# Patient Record
Sex: FEMALE | Race: WHITE | Employment: OTHER | URBAN - METROPOLITAN AREA
[De-identification: names, ages, dates, MRNs, and addresses within clinical notes are randomized per-mention and may not be internally consistent; named-entity substitution may affect disease eponyms.]

---

## 2017-01-17 ENCOUNTER — OFFICE VISIT (OUTPATIENT)
Dept: INTERNAL MEDICINE CLINIC | Age: 62
End: 2017-01-17

## 2017-01-17 VITALS
HEART RATE: 78 BPM | TEMPERATURE: 98.6 F | SYSTOLIC BLOOD PRESSURE: 136 MMHG | DIASTOLIC BLOOD PRESSURE: 64 MMHG | BODY MASS INDEX: 33.79 KG/M2 | OXYGEN SATURATION: 99 % | HEIGHT: 61 IN | WEIGHT: 179 LBS | RESPIRATION RATE: 14 BRPM

## 2017-01-17 DIAGNOSIS — K21.9 GASTROESOPHAGEAL REFLUX DISEASE WITHOUT ESOPHAGITIS: ICD-10-CM

## 2017-01-17 DIAGNOSIS — Z79.890 POSTMENOPAUSAL HRT (HORMONE REPLACEMENT THERAPY): ICD-10-CM

## 2017-01-17 DIAGNOSIS — F41.9 ANXIETY: Primary | ICD-10-CM

## 2017-01-17 DIAGNOSIS — R73.09 ELEVATED GLUCOSE: ICD-10-CM

## 2017-01-17 DIAGNOSIS — M85.80 OSTEOPENIA: ICD-10-CM

## 2017-01-17 DIAGNOSIS — M72.2 PLANTAR FASCIITIS OF LEFT FOOT: ICD-10-CM

## 2017-01-17 LAB — HBA1C MFR BLD HPLC: 5.5 %

## 2017-01-17 NOTE — PATIENT INSTRUCTIONS
I recommend 1200 mg calcium daily, preferably from calcium-rich or calcium-supplemented foods, and 1000 international units of vitamin D daily. You may need to add an additional calcium and vitamin D supplement to reach these goals. The main dietary sources of calcium include milk and other dairy products, such as cottage cheese, yogurt, or hard cheese, and green vegetables, such as kale and broccoli. A rough method of estimating dietary calcium intake is to multiply the number of dairy servings consumed each day by 300 mg. One serving is 8 oz of milk (236 mL) or yogurt (224 g), 1 oz (28 g) of hard cheese, or 16 oz (448 g) of cottage cheese. Plantar Fasciitis: Care Instructions  Your Care Instructions    Plantar fasciitis is pain and inflammation of the plantar fascia, the tissue at the bottom of your foot that connects the heel bone to the toes. The plantar fascia also supports the arch. If you strain the plantar fascia, it can develop small tears and cause heel pain when you stand or walk. Plantar fasciitis can be caused by running or other sports. It also may occur in people who are overweight or who have high arches or flat feet. You may get plantar fasciitis if you walk or stand for long periods, or have a tight Achilles tendon or calf muscles. You can improve your foot pain with rest and other care at home. It might take a few weeks to a few months for your foot to heal completely. Follow-up care is a key part of your treatment and safety. Be sure to make and go to all appointments, and call your doctor if you are having problems. It's also a good idea to know your test results and keep a list of the medicines you take. How can you care for yourself at home? · Rest your feet often. Reduce your activity to a level that lets you avoid pain. If possible, do not run or walk on hard surfaces. · Take pain medicines exactly as directed.   ¨ If the doctor gave you a prescription medicine for pain, take it as prescribed. ¨ If you are not taking a prescription pain medicine, take an over-the-counter anti-inflammatory medicine for pain and swelling, such as ibuprofen (Advil, Motrin) or naproxen (Aleve). Read and follow all instructions on the label. · Use ice massage to help with pain and swelling. You can use an ice cube or an ice cup several times a day. To make an ice cup, fill a paper cup with water and freeze it. Cut off the top of the cup until a half-inch of ice shows. Hold onto the remaining paper to use the cup. Rub the ice in small circles over the area for 5 to 7 minutes. · Contrast baths, which alternate hot and cold water, can also help reduce swelling. But because heat alone may make pain and swelling worse, end a contrast bath with a soak in cold water. · Wear a night splint if your doctor suggests it. A night splint holds your foot with the toes pointed up and the foot and ankle at a 90-degree angle. This position gives the bottom of your foot a constant, gentle stretch. · Do simple exercises such as calf stretches and towel stretches 2 to 3 times each day, especially when you first get up in the morning. These can help the plantar fascia become more flexible. They also make the muscles that support your arch stronger. Hold these stretches for 15 to 30 seconds per stretch. Repeat 2 to 4 times. ¨ Stand about 1 foot from a wall. Place the palms of both hands against the wall at chest level. Lean forward against the wall, keeping one leg with the knee straight and heel on the ground while bending the knee of the other leg. ¨ Sit down on the floor or a mat with your feet stretched in front of you. Roll up a towel lengthwise, and loop it over the ball of your foot. Holding the towel at both ends, gently pull the towel toward you to stretch your foot. · Wear shoes with good arch support. Athletic shoes or shoes with a well-cushioned sole are good choices.   · Try heel cups or shoe inserts (orthotics) to help cushion your heel. You can buy these at many shoe stores. · Put on your shoes as soon as you get out of bed. Going barefoot or wearing slippers may make your pain worse. · Reach and stay at a good weight for your height. This puts less strain on your feet. When should you call for help? Call your doctor now or seek immediate medical care if:  · You have heel pain with fever, redness, or warmth in your heel. · You cannot put weight on the sore foot. Watch closely for changes in your health, and be sure to contact your doctor if:  · You have numbness or tingling in your heel. · Your heel pain lasts more than 2 weeks. Where can you learn more? Go to http://juanjose-severo.info/. Steven Billingsley in the search box to learn more about \"Plantar Fasciitis: Care Instructions. \"  Current as of: May 23, 2016  Content Version: 11.1  © 1134-5195 [employer] [address]. Care instructions adapted under license by [employer] (which disclaims liability or warranty for this information). If you have questions about a medical condition or this instruction, always ask your healthcare professional. [place] [address] any warranty or liability for your use of this information. Plantar Fasciitis: Exercises  Your Care Instructions  Here are some examples of typical rehabilitation exercises for your condition. Start each exercise slowly. Ease off the exercise if you start to have pain. Your doctor or physical therapist will tell you when you can start these exercises and which ones will work best for you. How to do the exercises  Note: Each exercise should create a pulling feeling but should not cause pain. Towel stretch    1. Sit with your legs extended and knees straight. 2. Place a towel around your foot just under the toes. 3. Hold each end of the towel in each hand, with your hands above your knees.   4. Pull back with the towel so that your foot stretches toward you.  5. Hold the position for at least 15 to 30 seconds. 6. Repeat 2 to 4 times a session, up to 5 sessions a day. Calf stretch    Note: This exercise stretches the muscles at the back of the lower leg (the calf) and the Achilles tendon. Do this exercise 3 or 4 times a day, 5 days a week. 1. Stand facing a wall with your hands on the wall at about eye level. Put the leg you want to stretch about a step behind your other leg. 2. Keeping your back heel on the floor, bend your front knee until you feel a stretch in the back leg. 3. Hold the stretch for 15 to 30 seconds. Repeat 2 to 4 times. Plantar fascia and calf stretch    Note: Stretching the plantar fascia and calf muscles can increase flexibility and decrease heel pain. You can do this exercise several times each day and before and after activity. 1. Stand on a step as shown above. Be sure to hold on to the banister. 2. Slowly let your heels down over the edge of the step as you relax your calf muscles. You should feel a gentle stretch across the bottom of your foot and up the back of your leg to your knee. 3. Hold the stretch about 15 to 30 seconds, and then tighten your calf muscle a little to bring your heel back up to the level of the step. Repeat 2 to 4 times. Towel curls    1. While sitting, place your foot on a towel on the floor and scrunch the towel toward you with your toes. 2. Then, also using your toes, push the towel away from you. Note: Make this exercise more challenging by placing a weighted object, such as a soup can, on the other end of the towel. Samson pickups    1. Put marbles on the floor next to a cup.  2. Using your toes, try to lift the marbles up from the floor and put them in the cup. Follow-up care is a key part of your treatment and safety. Be sure to make and go to all appointments, and call your doctor if you are having problems.  It's also a good idea to know your test results and keep a list of the medicines you take.  Where can you learn more? Go to http://juanjose-severo.info/. Reena Lucas in the search box to learn more about \"Plantar Fasciitis: Exercises. \"  Current as of: May 23, 2016  Content Version: 11.1  © 1057-2695 [employer]. Care instructions adapted under license by [employer] (which disclaims liability or warranty for this information). If you have questions about a medical condition or this instruction, always ask your healthcare professional. [address] any warranty or liability for your use of this information.

## 2017-01-17 NOTE — PROGRESS NOTES
HPI:  [facility] is a 62y.o. year old female who returns to clinic today for follow up:   1. Anxiety: Current treatment includes Lexapro and no other therapies. Ongoing symptoms include: none. She denies: racing thoughts, feelings of losing control, difficulty concentrating, SI/SA, depression. Reported side effects from the treatment: none. 2. GERD: Taking medication daily and not having any breakthrough symptoms. Following gerd precautions. 3. Osteopenia: Taking calcium and vit D in diet and supplements, last bone density reviewed. No falls. Exercise: walking  4. HRT: hot flashes controlled on current dose of every other day premarin. She is trying to taper off of premarin. has not been using vagifem for vaginal dryness due to cost.    5. Left heel pain that is not improving and affecting ability to exercise. No improved with heel support and new shoes. Current Outpatient Prescriptions   Medication Sig Dispense Refill    escitalopram oxalate (LEXAPRO) 5 mg tablet Take 1 Tab by mouth daily. 90 Tab 1    esomeprazole (NEXIUM) 40 mg capsule TAKE 1 CAPSULE DAILY  Indications: GASTROESOPHAGEAL REFLUX 90 Cap 3    VIT C/VIT E/LUTEIN/MIN/OMEGA-3 (OCUVITE PO) Take  by mouth two (2) times a day.  LUTEIN PO Take 40 mg by mouth daily.  conjugated estrogens (PREMARIN) 0.3 mg tablet TAKE 1 TABLET DAILY 90 Tab 3    fluticasone (FLONASE) 50 mcg/actuation nasal spray 2 Sprays by Both Nostrils route daily. 3 Bottle 1    CALCIUM CITRATE/VITAMIN D3 (CITRACAL + D PO) Take  by mouth.  latanoprost (XALATAN) 0.005 % ophthalmic solution Administer 1 Drop to right eye nightly.  dorzolamide-timolol (COSOPT) 2-0.5 % ophthalmic solution Administer 1 Drop to right eye two (2) times a day.  multivitamins-minerals-lutein (CENTRUM SILVER) Tab Take  by mouth.  GLUCOSAM & CHONDROIT-MV & MIN3 (GLUCOSAMINE &CHONDROIT-MV-MIN3 PO) Take  by mouth.  ascorbic acid (VITAMIN C) 1,000 mg tablet Take  by mouth. No Known Allergies  Social History   Substance Use Topics    Smoking status: Never Smoker    Smokeless tobacco: Never Used    Alcohol use 0.0 oz/week      Comment: 3 drinks per year         Review of Systems   Constitutional: Negative for malaise/fatigue. Respiratory: Negative for shortness of breath. Cardiovascular: Negative for chest pain and leg swelling. Gastrointestinal: Negative for abdominal pain and heartburn. Neurological: Negative for dizziness and headaches. Physical Exam   Constitutional: She appears well-developed. No distress. /74 (BP 1 Location: Left arm, BP Patient Position: Sitting)  Pulse 78  Temp 98.6 °F (37 °C) (Oral)   Resp 14  Ht 5' 1\" (1.549 m)  Wt 179 lb (81.2 kg)  LMP 08/01/2004  SpO2 99%  BMI 33.82 kg/m2   Neck: Carotid bruit is not present. Cardiovascular: Normal rate, regular rhythm, normal heart sounds and intact distal pulses. No murmur heard. Pulmonary/Chest: Effort normal and breath sounds normal. She has no wheezes. Abdominal: Soft. Bowel sounds are normal. She exhibits no distension and no mass. There is no tenderness. Musculoskeletal: She exhibits no edema. Left heel plantar tenderness. Psychiatric: She has a normal mood and affect. Vitals reviewed. Assessment & Plan:    ICD-10-CM ICD-9-CM    1. Anxiety  Well controlled, continue current medication. F41.9 300.00    2. Gastroesophageal reflux disease without esophagitis  Well controlled, continue current medication. K21.9 530.81    3. Osteopenia  Calcium vit D and exercise. M85.80 733.90    4. Postmenopausal HRT (hormone replacement therapy)  Continue to try tapering premarin Z79.890 V07.4    5. Elevated glucose  Follow low sugar diet. a1c 5.5.  R73.09 790.29 AMB POC HEMOGLOBIN A1C   6. Plantar fasciitis of left foot  Counseled and will refer to podiatry M72.2 728.71 REFERRAL TO PODIATRY        Follow-up Disposition:  Return in about 6 months (around 7/17/2017) for follow up. Advised her to call back or return to office if symptoms worsen/change/persist.  Discussed expected course/resolution/complications of diagnosis in detail with patient. Medication risks/benefits/costs/interactions/alternatives discussed with patient. She was given an after visit summary which includes diagnoses, current medications, & vitals. She expressed understanding with the diagnosis and plan.

## 2017-08-11 ENCOUNTER — OFFICE VISIT (OUTPATIENT)
Dept: INTERNAL MEDICINE CLINIC | Age: 62
End: 2017-08-11

## 2017-08-11 VITALS
SYSTOLIC BLOOD PRESSURE: 120 MMHG | TEMPERATURE: 97.9 F | DIASTOLIC BLOOD PRESSURE: 70 MMHG | HEIGHT: 62 IN | WEIGHT: 184.6 LBS | HEART RATE: 94 BPM | OXYGEN SATURATION: 98 % | RESPIRATION RATE: 12 BRPM | BODY MASS INDEX: 33.97 KG/M2

## 2017-08-11 DIAGNOSIS — N89.8 VAGINAL DRYNESS: ICD-10-CM

## 2017-08-11 DIAGNOSIS — R73.09 ELEVATED GLUCOSE: ICD-10-CM

## 2017-08-11 DIAGNOSIS — K21.9 GASTROESOPHAGEAL REFLUX DISEASE WITHOUT ESOPHAGITIS: ICD-10-CM

## 2017-08-11 DIAGNOSIS — M85.80 OSTEOPENIA, UNSPECIFIED LOCATION: ICD-10-CM

## 2017-08-11 DIAGNOSIS — Z00.00 ROUTINE GENERAL MEDICAL EXAMINATION AT A HEALTH CARE FACILITY: Primary | ICD-10-CM

## 2017-08-11 RX ORDER — ESTRADIOL 10 UG/1
INSERT VAGINAL
Qty: 24 TAB | Refills: 3 | Status: SHIPPED | OUTPATIENT
Start: 2017-08-11 | End: 2019-09-12

## 2017-08-11 RX ORDER — ESOMEPRAZOLE MAGNESIUM 40 MG/1
CAPSULE, DELAYED RELEASE ORAL
Qty: 90 CAP | Refills: 3 | Status: SHIPPED | OUTPATIENT
Start: 2017-08-11 | End: 2018-07-07 | Stop reason: SDUPTHER

## 2017-08-11 NOTE — MR AVS SNAPSHOT
Visit Information Date & Time Provider Department Dept. Phone Encounter #  
 8/11/2017 10:45 AM Dora Hutton, [address] [facility] [phone] [id number] Follow-up Instructions Return in about 1 year (around 8/11/2018) for physical.  
  
Upcoming Health Maintenance Date Due COLONOSCOPY 3/29/2017 INFLUENZA AGE 9 TO ADULT 8/1/2017 DTaP/Tdap/Td series (2 - Td) 9/16/2018 BREAST CANCER SCRN MAMMOGRAM 11/11/2018 Allergies as of 8/11/2017  Review Complete On: 8/11/2017 By: Dora Hutton MD  
 No Known Allergies Current Immunizations  Reviewed on 11/4/2016 Name Date H1N1 FLU VACCINE 1/10/2010 Influenza Vaccine 10/5/2016, 10/15/2015, 10/5/2014, 10/5/2013 Influenza Vaccine Split 10/17/2012, 10/14/2011 TDAP Vaccine 9/16/2008 Zoster Vaccine, Live 11/1/2016 Not reviewed this visit You Were Diagnosed With   
  
 Codes Comments Routine general medical examination at a health care facility    -  Primary ICD-10-CM: Z00.00 ICD-9-CM: V70.0 Gastroesophageal reflux disease without esophagitis     ICD-10-CM: K21.9 ICD-9-CM: 530.81 Osteopenia, unspecified location     ICD-10-CM: M85.80 ICD-9-CM: 733.90 Vaginal dryness     ICD-10-CM: N89.8 ICD-9-CM: 625.8 Elevated glucose     ICD-10-CM: R73.09 
ICD-9-CM: 790.29 Vitals BP Pulse Temp Resp Height(growth percentile) Weight(growth percentile) 120/70 (BP 1 Location: Right arm, BP Patient Position: Sitting) 94 97.9 °F (36.6 °C) (Oral) 12 5' 1.5\" (1.562 m) 184 lb 9.6 oz (83.7 kg) LMP SpO2 BMI OB Status Smoking Status 08/01/2004 98% 34.32 kg/m2 Hysterectomy Never Smoker BMI and BSA Data Body Mass Index Body Surface Area  
 34.32 kg/m 2 1.91 m 2 Preferred Pharmacy Pharmacy Name Phone [address] [facility] [phone] Your Updated Medication List  
  
   
 This list is accurate as of: 8/11/17 11:43 AM.  Always use your most recent med list.  
  
  
  
  
 CENTRUM SILVER Tab tablet Generic drug:  multivitamins-minerals-lutein Take  by mouth. CITRACAL + D PO Take  by mouth. COSOPT 22.3-6.8 mg/mL ophthalmic solution Generic drug:  dorzolamide-timolol Administer 1 Drop to right eye two (2) times a day. esomeprazole 40 mg capsule Commonly known as:  NEXIUM  
TAKE 1 CAPSULE DAILY  Indications: gastroesophageal reflux disease  
  
 estradiol 10 mcg Tab vaginal tablet Commonly known as:  Rommel Aung Vaginal tablet, 10 mcg inserted VAGINALLY once daily for 2 weeks; then, 10 mcg VAGINALLY twice weekly  
  
 fluticasone 50 mcg/actuation nasal spray Commonly known as:  FLONASE  
USE 2 SPRAYS IN EACH NOSTRIL DAILY  
  
 GLUCOSAMINE &CHONDROIT-MV-MIN3 PO Take  by mouth. LUTEIN PO Take 40 mg by mouth daily. OCUVITE PO Take  by mouth two (2) times a day. VITAMIN C 1,000 mg tablet Generic drug:  ascorbic acid (vitamin C) Take  by mouth. XALATAN 0.005 % ophthalmic solution Generic drug:  latanoprost  
Administer 1 Drop to right eye nightly. Prescriptions Sent to Pharmacy Refills  
 esomeprazole (NEXIUM) 40 mg capsule 3 Sig: TAKE 1 CAPSULE DAILY  Indications: gastroesophageal reflux disease Class: Normal  
 Pharmacy: [address], [address] Ph #: [phone]  
 estradiol (VAGIFEM) 10 mcg tab vaginal tablet 3 Sig: Vaginal tablet, 10 mcg inserted VAGINALLY once daily for 2 weeks; then, 10 mcg VAGINALLY twice weekly Class: Normal  
 Pharmacy: [address], [address] Ph #: [phone] We Performed the Following CBC WITH AUTOMATED DIFF [01835 CPT(R)] HEMOGLOBIN A1C WITH EAG [92944 CPT(R)] LIPID PANEL [04984 CPT(R)] METABOLIC PANEL, COMPREHENSIVE [45171 CPT(R)] Follow-up Instructions Return in about 1 year (around 8/11/2018) for physical.  
  
  
Patient Instructions [facility] External Physician  
  
  
  Specialty  
  Endocrinology, Internal Medicine  
   
Primary Contact Information   
  Phone Fax E-mail Address  
  [phone] Not available. [address] [facility] [address] [facility]  
   [place] [id number]  
     
   
Additional Contact Information   
  Phone Fax E-mail Address  
  [phone] Not available. [phone]  
     
   
 
 
 
 
  
Introducing [facility] & [facility] SERVICES! Dear Bhavesh Dodd: Thank you for requesting a [employer] account. Our records indicate that you already have an active WeWork account. You can access your account anytime at [email or web address] Did you know that you can access your hospital and ER discharge instructions at any time in WeWork? You can also review all of your test results from your hospital stay or ER visit. Additional Information If you have questions, please visit the Frequently Asked Questions section of the WeWork website at [email or web address] Remember, WeWork is NOT to be used for urgent needs. For medical emergencies, dial 911. Now available from your iPhone and Android! Please provide this summary of care documentation to your next provider. Your primary care clinician is listed as Pili Rose. If you have any questions after today's visit, please call [phone].

## 2017-08-11 NOTE — PATIENT INSTRUCTIONS
[facility]    External Physician           Specialty     Endocrinology, Internal Medicine       Primary Contact Information      Phone Fax E-mail Address     [phone] Not available. [address] [facility]      [address] [place] [id number]             Additional Contact Information      Phone Fax E-mail Address     [phone] Not available.  [phone]

## 2017-08-12 NOTE — PROGRESS NOTES
Subjective:   62 y.o. female for Well Woman Check. She is postmenopausal.  Social History: not sexually active. She complains of vaginal dryness and request vagifem which she has used with improvement in her symptoms in the past.  GERD: She has tried to stop Nexium but is having significant symptoms of indigestion. She has been taking Pepcid and Tums with poor control. Would like to restart Nexium. Osteopenia:  Taking calcium and vit D,  last bone density reviewed. No falls. Exercise: some walking. Current Outpatient Prescriptions   Medication Sig Dispense Refill    esomeprazole (NEXIUM) 40 mg capsule TAKE 1 CAPSULE DAILY  Indications: gastroesophageal reflux disease 90 Cap 3    estradiol (VAGIFEM) 10 mcg tab vaginal tablet Vaginal tablet, 10 mcg inserted VAGINALLY once daily for 2 weeks; then, 10 mcg VAGINALLY twice weekly 24 Tab 3    fluticasone (FLONASE) 50 mcg/actuation nasal spray USE 2 SPRAYS IN EACH NOSTRIL DAILY 48 g 5    VIT C/VIT E/LUTEIN/MIN/OMEGA-3 (OCUVITE PO) Take  by mouth two (2) times a day.  LUTEIN PO Take 40 mg by mouth daily.  CALCIUM CITRATE/VITAMIN D3 (CITRACAL + D PO) Take  by mouth.  latanoprost (XALATAN) 0.005 % ophthalmic solution Administer 1 Drop to right eye nightly.  dorzolamide-timolol (COSOPT) 2-0.5 % ophthalmic solution Administer 1 Drop to right eye two (2) times a day.  multivitamins-minerals-lutein (CENTRUM SILVER) Tab Take  by mouth.  GLUCOSAM & CHONDROIT-MV & MIN3 (GLUCOSAMINE &CHONDROIT-MV-MIN3 PO) Take  by mouth.  ascorbic acid (VITAMIN C) 1,000 mg tablet Take  by mouth.        No Known Allergies  Past Medical History:   Diagnosis Date    Allergic rhinitis     Anxiety 7/19/2016    Diverticulosis     GERD (gastroesophageal reflux disease)     Glaucoma     Osteopenia     S/P colonoscopy 2008    diverticuli     Past Surgical History:   Procedure Laterality Date    BREAST SURGERY PROCEDURE UNLISTED  1995    breast biopsy    BREAST SURGERY PROCEDURE UNLISTED  2001    breast cyst aspiration    ENDOSCOPY, COLON, DIAGNOSTIC  January 2008    HX BREAST BIOPSY Right 1993    neg    HX CYST INCISION AND DRAINAGE Left 2001    neg    HX ORTHOPAEDIC  2002    ganglion cyst    HX REFRACTIVE SURGERY  1999    with Dr. Elie Waggoner , subsequently she had a right amcular crack and has been treated by Dr. Selma Sanders       Family History   Problem Relation Age of Onset    Other Mother      Peptic ulcer disease    Osteoporosis Mother     Hypertension Mother      pulmonary hypertension    Lung Disease Mother 80     pulmonary hypertension    Cancer Maternal Aunt      breast cancer     Social History   Substance Use Topics    Smoking status: Never Smoker    Smokeless tobacco: Never Used    Alcohol use 0.0 oz/week      Comment: 3 drinks per year        ROS:  Feeling well. No dyspnea or chest pain on exertion. No abdominal pain, change in bowel habits, black or bloody stools. No urinary tract symptoms. GYN ROS: no breast pain or new or enlarging lumps on self exam, no vaginal bleeding, no discharge or pelvic pain. Menopausal symptoms: vaginal dryness. No neurological complaints. Objective:     Visit Vitals    /70 (BP 1 Location: Right arm, BP Patient Position: Sitting)    Pulse 94    Temp 97.9 °F (36.6 °C) (Oral)    Resp 12    Ht 5' 1.5\" (1.562 m)    Wt 184 lb 9.6 oz (83.7 kg)    LMP 08/01/2004    SpO2 98%    BMI 34.32 kg/m2     The patient appears well, alert, oriented x 3, in no distress. ENT normal.  Neck supple. No adenopathy or thyromegaly. ELISABETH. Lungs are clear, good air entry, no wheezes, rhonchi or rales. S1 and S2 normal, no murmurs, regular rate and rhythm. Abdomen soft without tenderness, guarding, mass or organomegaly. Extremities show no edema, normal peripheral pulses. Neurological is normal, no focal findings.     BREAST EXAM: breasts appear normal, no suspicious masses, no skin or nipple changes or axillary nodes    PELVIC EXAM: s/p hyst. Exam not indicated    Assessment/Plan:     Diagnoses and all orders for this visit:    1. Routine general medical examination at a health care facility  Counseled continue healthy lifestyle, exercise, diet and weight.      -     CBC WITH AUTOMATED DIFF  -     METABOLIC PANEL, COMPREHENSIVE  -     LIPID PANEL    2. Gastroesophageal reflux disease without esophagitis  nexium every day. GERD precautions    3. Osteopenia, unspecified location  Calcium, vitamin D, exercise. 4. Vaginal dryness  Trial of Vagifem. Also recommend over-the-counter lubricants. 5. Elevated glucose  Mild elevation follow low sugar low carbohydrate diet and lose weight  -     HEMOGLOBIN A1C WITH EAG    Other orders  -     esomeprazole (NEXIUM) 40 mg capsule; TAKE 1 CAPSULE DAILY  Indications: gastroesophageal reflux disease  -     estradiol (VAGIFEM) 10 mcg tab vaginal tablet; Vaginal tablet, 10 mcg inserted VAGINALLY once daily for 2 weeks; then, 10 mcg VAGINALLY twice weekly    . I have discussed the diagnosis with the patient and the intended plan as seen in the above orders. The patient has received an after-visit summary and questions were answered concerning future plans. I have discussed medication side effects and warnings with the patient as well.   She has expressed understanding of the diagnosis and plan    Follow-up Disposition:  Return in about 1 year (around 8/11/2018) for physical.

## 2017-08-15 LAB
ALBUMIN SERPL-MCNC: 4.2 G/DL (ref 3.6–4.8)
ALBUMIN/GLOB SERPL: 1.8 {RATIO} (ref 1.2–2.2)
ALP SERPL-CCNC: 122 IU/L (ref 39–117)
ALT SERPL-CCNC: 14 IU/L (ref 0–32)
AST SERPL-CCNC: 14 IU/L (ref 0–40)
BASOPHILS # BLD AUTO: 0 X10E3/UL (ref 0–0.2)
BASOPHILS NFR BLD AUTO: 1 %
BILIRUB SERPL-MCNC: 0.4 MG/DL (ref 0–1.2)
BUN SERPL-MCNC: 17 MG/DL (ref 8–27)
BUN/CREAT SERPL: 21 (ref 12–28)
CALCIUM SERPL-MCNC: 8.8 MG/DL (ref 8.7–10.3)
CHLORIDE SERPL-SCNC: 105 MMOL/L (ref 96–106)
CHOLEST SERPL-MCNC: 201 MG/DL (ref 100–199)
CO2 SERPL-SCNC: 24 MMOL/L (ref 18–29)
CREAT SERPL-MCNC: 0.8 MG/DL (ref 0.57–1)
EOSINOPHIL # BLD AUTO: 0.3 X10E3/UL (ref 0–0.4)
EOSINOPHIL NFR BLD AUTO: 6 %
ERYTHROCYTE [DISTWIDTH] IN BLOOD BY AUTOMATED COUNT: 13.9 % (ref 12.3–15.4)
EST. AVERAGE GLUCOSE BLD GHB EST-MCNC: 108 MG/DL
GLOBULIN SER CALC-MCNC: 2.3 G/DL (ref 1.5–4.5)
GLUCOSE SERPL-MCNC: 104 MG/DL (ref 65–99)
HBA1C MFR BLD: 5.4 % (ref 4.8–5.6)
HCT VFR BLD AUTO: 41.5 % (ref 34–46.6)
HDLC SERPL-MCNC: 75 MG/DL
HGB BLD-MCNC: 13.9 G/DL (ref 11.1–15.9)
IMM GRANULOCYTES # BLD: 0 X10E3/UL (ref 0–0.1)
IMM GRANULOCYTES NFR BLD: 0 %
LDLC SERPL CALC-MCNC: 104 MG/DL (ref 0–99)
LYMPHOCYTES # BLD AUTO: 1.3 X10E3/UL (ref 0.7–3.1)
LYMPHOCYTES NFR BLD AUTO: 24 %
MCH RBC QN AUTO: 29.1 PG (ref 26.6–33)
MCHC RBC AUTO-ENTMCNC: 33.5 G/DL (ref 31.5–35.7)
MCV RBC AUTO: 87 FL (ref 79–97)
MONOCYTES # BLD AUTO: 0.3 X10E3/UL (ref 0.1–0.9)
MONOCYTES NFR BLD AUTO: 6 %
NEUTROPHILS # BLD AUTO: 3.4 X10E3/UL (ref 1.4–7)
NEUTROPHILS NFR BLD AUTO: 63 %
PLATELET # BLD AUTO: 234 X10E3/UL (ref 150–379)
POTASSIUM SERPL-SCNC: 4.6 MMOL/L (ref 3.5–5.2)
PROT SERPL-MCNC: 6.5 G/DL (ref 6–8.5)
RBC # BLD AUTO: 4.77 X10E6/UL (ref 3.77–5.28)
SODIUM SERPL-SCNC: 144 MMOL/L (ref 134–144)
TRIGL SERPL-MCNC: 110 MG/DL (ref 0–149)
VLDLC SERPL CALC-MCNC: 22 MG/DL (ref 5–40)
WBC # BLD AUTO: 5.4 X10E3/UL (ref 3.4–10.8)

## 2017-11-20 ENCOUNTER — HOSPITAL ENCOUNTER (OUTPATIENT)
Dept: MAMMOGRAPHY | Age: 62
Discharge: HOME | End: 2017-11-20
Attending: INTERNAL MEDICINE
Payer: COMMERCIAL

## 2017-11-20 DIAGNOSIS — Z12.31 VISIT FOR SCREENING MAMMOGRAM: ICD-10-CM

## 2017-11-20 PROCEDURE — 77067 SCR MAMMO BI INCL CAD: CPT

## 2018-08-16 ENCOUNTER — OFFICE VISIT (OUTPATIENT)
Dept: INTERNAL MEDICINE CLINIC | Age: 63
End: 2018-08-16

## 2018-08-16 VITALS
OXYGEN SATURATION: 97 % | DIASTOLIC BLOOD PRESSURE: 80 MMHG | RESPIRATION RATE: 16 BRPM | WEIGHT: 189.6 LBS | BODY MASS INDEX: 34.89 KG/M2 | HEART RATE: 88 BPM | HEIGHT: 62 IN | TEMPERATURE: 97.4 F | SYSTOLIC BLOOD PRESSURE: 120 MMHG

## 2018-08-16 DIAGNOSIS — E66.01 SEVERE OBESITY (BMI 35.0-39.9): ICD-10-CM

## 2018-08-16 DIAGNOSIS — Z23 ENCOUNTER FOR IMMUNIZATION: ICD-10-CM

## 2018-08-16 DIAGNOSIS — M85.80 OSTEOPENIA, UNSPECIFIED LOCATION: ICD-10-CM

## 2018-08-16 DIAGNOSIS — Z12.31 VISIT FOR SCREENING MAMMOGRAM: ICD-10-CM

## 2018-08-16 DIAGNOSIS — Z00.00 ROUTINE GENERAL MEDICAL EXAMINATION AT A HEALTH CARE FACILITY: Primary | ICD-10-CM

## 2018-08-16 DIAGNOSIS — Z78.0 POSTMENOPAUSAL: ICD-10-CM

## 2018-08-16 DIAGNOSIS — M79.89 SWELLING OF LOWER EXTREMITY: ICD-10-CM

## 2018-08-16 DIAGNOSIS — N89.8 VAGINAL DRYNESS: ICD-10-CM

## 2018-08-16 DIAGNOSIS — K21.9 GASTROESOPHAGEAL REFLUX DISEASE WITHOUT ESOPHAGITIS: ICD-10-CM

## 2018-08-16 RX ORDER — ESOMEPRAZOLE MAGNESIUM 40 MG/1
CAPSULE, DELAYED RELEASE ORAL
Qty: 90 CAP | Refills: 3 | Status: SHIPPED | OUTPATIENT
Start: 2018-08-16 | End: 2019-09-12 | Stop reason: SDUPTHER

## 2018-08-16 NOTE — PATIENT INSTRUCTIONS
[facility]    External Physician           Specialty     Endocrinology, Internal Medicine       Primary Contact Information      Phone Fax E-mail Address     [phone] Not available. [address] [facility]      [place] [address]             Additional Contact Information      Phone Fax E-mail Address     [phone] Not available.  [facility] [phone]

## 2018-08-16 NOTE — MR AVS SNAPSHOT
[address] [address] 
[phone] Patient: Salazar Andre MRN: [id number] IHV:[id number] Visit Information Date & Time Provider Department Dept. Phone Encounter #  
 8/16/2018  9:15 AM Nabeel Cohen, [facility] [phone] [id number] Upcoming Health Maintenance Date Due Influenza Age 5 to Adult 8/1/2018 DTaP/Tdap/Td series (2 - Td) 9/16/2018 BREAST CANCER SCRN MAMMOGRAM 11/20/2019 COLONOSCOPY 2/6/2028 Allergies as of 8/16/2018  Review Complete On: 8/16/2018 By: Nabeel Cohen MD  
 No Known Allergies Current Immunizations  Reviewed on 11/4/2016 Name Date H1N1 FLU VACCINE 1/10/2010 Influenza Vaccine 10/5/2016, 10/15/2015, 10/5/2014, 10/5/2013 Influenza Vaccine Split 10/17/2012, 10/14/2011 TDAP Vaccine 9/16/2008 Tdap  Incomplete Zoster Vaccine, Live 11/1/2016 Not reviewed this visit You Were Diagnosed With   
  
 Codes Comments Routine general medical examination at a health care facility    -  Primary ICD-10-CM: Z00.00 ICD-9-CM: V70.0 Gastroesophageal reflux disease without esophagitis     ICD-10-CM: K21.9 ICD-9-CM: 530.81 Osteopenia, unspecified location     ICD-10-CM: M85.80 ICD-9-CM: 733.90 Vaginal dryness     ICD-10-CM: N89.8 ICD-9-CM: 625.8 Severe obesity (BMI 35.0-39.9) ([facility])     ICD-10-CM: E66.01 
ICD-9-CM: 278.01 Visit for screening mammogram     ICD-10-CM: Z12.31 
ICD-9-CM: V76.12 Postmenopausal     ICD-10-CM: Z78.0 ICD-9-CM: V49.81 Encounter for immunization     ICD-10-CM: Y64 ICD-9-CM: V03.89 Swelling of lower extremity     ICD-10-CM: M79.89 ICD-9-CM: 729.81 Vitals BP Pulse Temp Resp Height(growth percentile) Weight(growth percentile) 120/80 (BP 1 Location: Left arm, BP Patient Position: Sitting) 88 97.4 °F (36.3 °C) (Oral) 16 5' 1.5\" (1.562 m) 189 lb 9.6 oz (86 kg) LMP SpO2 BMI OB Status Smoking Status 2004 97% 35.24 kg/m2 Hysterectomy Never Smoker BMI and BSA Data Body Mass Index Body Surface Area  
 35.24 kg/m 2 1.93 m 2 Preferred Pharmacy Pharmacy Name Phone [address] [phone] Your Updated Medication List  
  
   
This list is accurate as of 18 10:10 AM.  Always use your most recent med list.  
  
  
  
  
 CENTRUM SILVER Tab tablet Generic drug:  multivitamins-minerals-lutein Take  by mouth. CITRACAL + D PO Take  by mouth. COSOPT 22.3-6.8 mg/mL ophthalmic solution Generic drug:  dorzolamide-timolol Administer 1 Drop to right eye two (2) times a day. esomeprazole 40 mg capsule Commonly known as:  NEXIUM  
TAKE 1 CAPSULE DAILY FOR GASTROESOPHAGEAL REFLUX DISEASE  
  
 estradiol 10 mcg Tab vaginal tablet Commonly known as:  Alexa Ip Vaginal tablet, 10 mcg inserted VAGINALLY once daily for 2 weeks; then, 10 mcg VAGINALLY twice weekly  
  
 fluticasone 50 mcg/actuation nasal spray Commonly known as:  FLONASE  
USE 2 SPRAYS IN EACH NOSTRIL DAILY  
  
 GLUCOSAMINE &CHONDROIT-MV-MIN3 PO Take  by mouth. LUTEIN PO Take 40 mg by mouth daily. OCUVITE PO Take  by mouth two (2) times a day. varicella-zoster recombinant (PF) 50 mcg/0.5 mL Susr injection Commonly known as:  SHINGRIX  
0.5 ml intramuscular now and then repeat in 2-4 months. VITAMIN C 1,000 mg tablet Generic drug:  ascorbic acid (vitamin C) Take  by mouth. XALATAN 0.005 % ophthalmic solution Generic drug:  latanoprost  
Administer 1 Drop to right eye nightly. Prescriptions Printed Refills  
 varicella-zoster recombinant, PF, (SHINGRIX) 50 mcg/0.5 mL susr injection 1 Si.5 ml intramuscular now and then repeat in 2-4 months. Class: Print Prescriptions Sent to Pharmacy Refills esomeprazole (NEXIUM) 40 mg capsule 3 Sig: TAKE 1 CAPSULE DAILY FOR GASTROESOPHAGEAL REFLUX DISEASE Class: Normal  
 Pharmacy: [facility], [address] [place] [address] #: [phone] We Performed the Following CBC WITH AUTOMATED DIFF [26513 CPT(R)] HEMOGLOBIN A1C WITH EAG [12589 CPT(R)] LIPID PANEL [66135 CPT(R)] METABOLIC PANEL, COMPREHENSIVE [37529 CPT(R)] TETANUS, DIPHTHERIA TOXOIDS AND ACELLULAR PERTUSSIS VACCINE (TDAP), IN INDIVIDS. >=7, IM [id number] CPT(R)] To-Do List   
 08/16/2018 Imaging:  DEXA BONE DENSITY STUDY AXIAL   
  
 08/16/2018 Imaging:  RAMEZ 3D JASON W MAMMO BI SCREENING INCL CAD Referral Information Referral ID Ref[facility] By [facility]  
  
 [id number] Visits Status Start Date End Date 1 New Request 8/16/18 8/16/19 If your referral has a status of pending review or denied, additional information will be sent to support the outcome of this decision. Patient Instructions [facility] External Physician  
  
  
  Specialty  
  Endocrinology, Internal Medicine  
   
Primary Contact Information   
  Phone Fax E-mail Address  
  [phone] Not available. [facility] [address] [facility]  
   [place] [address]  
     
   
Additional Contact Information   
  Phone Fax E-mail Address  
  [phone] Not available. [phone]  
     
   
 
  
 
 
  
Introducing [facility]! Dear [facility]: Thank you for requesting a [email or web address] account. Our records indicate that you already have an active [email or web address] account. You can access your account anytime at [email or web address] Did you know that you can access your hospital and ER discharge instructions at any time in [email or web address]? You can also review all of your test results from your hospital stay or ER visit. Additional Information If you have questions, please visit the Frequently Asked Questions section of the [email or web address] website at [email or web address] Remember, [email or web address] is NOT to be used for urgent needs. For medical emergencies, dial 911. Now available from your iPhone and Android! Please provide this summary of care documentation to your next provider. Your primary care clinician is listed as Viviane Kelly. If you have any questions after today's visit, please call [phone].

## 2018-08-16 NOTE — PROGRESS NOTES
Subjective:   65 y.o. female for Well Woman Check. She is postmenopausal. S/p hyst.   Social History: single partner, contraception - status post hysterectomy and post menopausal status. She notes some swelling left lower leg area when she traveled to [place] and did wear some compression stockings on the plane. Since returning from her trip note some mild swelling in left ankle. She is trying to follow a healthy diet. She is not as active. GERD: Taking medication daily and if misses dose has breakthrough gerd symptoms. Following gerd precautions. Osteopenia:  Taking calcium and vit D,  last bone density reviewed. No falls this year. Exercise: some walking  Current Outpatient Prescriptions   Medication Sig Dispense Refill    esomeprazole (NEXIUM) 40 mg capsule TAKE 1 CAPSULE DAILY FOR GASTROESOPHAGEAL REFLUX DISEASE 90 Cap 0    fluticasone (FLONASE) 50 mcg/actuation nasal spray USE 2 SPRAYS IN EACH NOSTRIL DAILY 48 g 5    estradiol (VAGIFEM) 10 mcg tab vaginal tablet Vaginal tablet, 10 mcg inserted VAGINALLY once daily for 2 weeks; then, 10 mcg VAGINALLY twice weekly 24 Tab 3    VIT C/VIT E/LUTEIN/MIN/OMEGA-3 (OCUVITE PO) Take  by mouth two (2) times a day.  LUTEIN PO Take 40 mg by mouth daily.  CALCIUM CITRATE/VITAMIN D3 (CITRACAL + D PO) Take  by mouth.  latanoprost (XALATAN) 0.005 % ophthalmic solution Administer 1 Drop to right eye nightly.  dorzolamide-timolol (COSOPT) 2-0.5 % ophthalmic solution Administer 1 Drop to right eye two (2) times a day.  multivitamins-minerals-lutein (CENTRUM SILVER) Tab Take  by mouth.  GLUCOSAM & CHONDROIT-MV & MIN3 (GLUCOSAMINE &CHONDROIT-MV-MIN3 PO) Take  by mouth.  ascorbic acid (VITAMIN C) 1,000 mg tablet Take  by mouth.        No Known Allergies  Past Medical History:   Diagnosis Date    Allergic rhinitis     Anxiety 7/19/2016    Diverticulosis     GERD (gastroesophageal reflux disease)     Glaucoma     Osteopenia     S/P colonoscopy 2008    diverticuli     Past Surgical History:   Procedure Laterality Date    BREAST SURGERY PROCEDURE UNLISTED  1995    breast biopsy    BREAST SURGERY PROCEDURE UNLISTED  2001    breast cyst aspiration    ENDOSCOPY, COLON, DIAGNOSTIC  January 2008    HX BREAST BIOPSY Right 1993    neg    HX CYST INCISION AND DRAINAGE Left 2001    neg    HX ORTHOPAEDIC  2002    ganglion cyst    HX REFRACTIVE SURGERY  1999    with Dr. Patricia Márquez , subsequently she had a right amcular crack and has been treated by Dr. Elsa Padilla       Family History   Problem Relation Age of Onset    Other Mother      Peptic ulcer disease    Osteoporosis Mother     Hypertension Mother      pulmonary hypertension    Lung Disease Mother 80     pulmonary hypertension    Cancer Maternal Aunt      breast cancer     Social History   Substance Use Topics    Smoking status: Never Smoker    Smokeless tobacco: Never Used    Alcohol use 0.0 oz/week      Comment: 3 drinks per year        ROS:  Feeling well. No dyspnea or chest pain on exertion. No abdominal pain, change in bowel habits, black or bloody stools. No urinary tract symptoms. GYN ROS: no breast pain or new or enlarging lumps on self exam, no vaginal bleeding, no discharge or pelvic pain. Menopausal symptoms: none. No neurological complaints. Objective:     Visit Vitals    /80 (BP 1 Location: Left arm, BP Patient Position: Sitting)    Pulse 88    Temp 97.4 °F (36.3 °C) (Oral)    Resp 16    Ht 5' 1.5\" (1.562 m)    Wt 189 lb 9.6 oz (86 kg)    LMP 08/01/2004    SpO2 97%    BMI 35.24 kg/m2     The patient appears well, alert, oriented x 3, in no distress. ENT normal.  Neck supple. No adenopathy or thyromegaly. ELISABETH. Lungs are clear, good air entry, no wheezes, rhonchi or rales. S1 and S2 normal, no murmurs, regular rate and rhythm. Abdomen soft without tenderness, guarding, mass or organomegaly.  Extremities show no edema, normal peripheral pulses. Neurological is normal, no focal findings. BREAST EXAM: breasts appear normal, no suspicious masses, no skin or nipple changes or axillary nodes    PELVIC EXAM: not indicated. Assessment/Plan:       ICD-10-CM ICD-9-CM    1. Routine general medical examination at a health care facility  Health maintenance reviewed and updated with patient today at visit. Z00.00 V70.0 CBC WITH AUTOMATED DIFF      METABOLIC PANEL, COMPREHENSIVE      LIPID PANEL      HEMOGLOBIN A1C WITH EAG   2. Gastroesophageal reflux disease without esophagitis  . [email or web address]  K21.9 530.81    3. Osteopenia, unspecified location  Ca vit D and exercise. M85.80 733.90 DEXA BONE DENSITY STUDY AXIAL   4. Vaginal dryness  Continue vagifem doing well. N89.8 625.8    5. Severe obesity (BMI 35.0-39.9) ([facility] [address]  Counseled regarding elevated BMI and current weight goals. Reviewed weight loss strategies including dietary changes and exercise. E66.01 278.01    6. Visit for screening mammogram Z12.31 V76.12 RAMEZ 3D JASON W MAMMO BI SCREENING INCL CAD   7. Postmenopausal Z78.0 V49.81 DEXA BONE DENSITY STUDY AXIAL   8. Encounter for immunization Z23 V03.89 TETANUS, DIPHTHERIA TOXOIDS AND ACELLULAR PERTUSSIS VACCINE (TDAP), IN INDIVIDS. >=7, IM   9. Swelling of lower extremity  Counseled regarding compression stockings, avoiding salt, weight loss, elevation of legs. M79.89 729.81    . Orders Placed This Encounter    RAMEZ 3D JASON W MAMMO BI SCREENING INCL CAD    DEXA BONE DENSITY STUDY AXIAL    TETANUS, DIPHTHERIA TOXOIDS AND ACELLULAR PERTUSSIS VACCINE (TDAP), IN INDIVIDS. >=7, IM    CBC WITH AUTOMATED DIFF    METABOLIC PANEL, COMPREHENSIVE    LIPID PANEL    HEMOGLOBIN A1C WITH EAG    varicella-zoster recombinant, PF, (SHINGRIX) 50 mcg/0.5 mL susr injection    esomeprazole (NEXIUM) 40 mg capsule      I have discussed the diagnosis with the patient and the intended plan as seen in the above orders.   The patient has received an after-visit summary and questions were answered concerning future plans. I have discussed medication side effects and warnings with the patient as well.   She has expressed understanding of the diagnosis and plan    Follow-up Disposition:  Return in about 1 year (around 8/16/2019) for physical.

## 2018-08-16 NOTE — PROGRESS NOTES
Chief Complaint   Patient presents with    Physical     Patient states she is here for her physical.

## 2018-08-17 LAB
ALBUMIN SERPL-MCNC: 4.5 G/DL (ref 3.6–4.8)
ALBUMIN/GLOB SERPL: 1.9 {RATIO} (ref 1.2–2.2)
ALP SERPL-CCNC: 146 IU/L (ref 39–117)
ALT SERPL-CCNC: 16 IU/L (ref 0–32)
AST SERPL-CCNC: 16 IU/L (ref 0–40)
BASOPHILS # BLD AUTO: 0 X10E3/UL (ref 0–0.2)
BASOPHILS NFR BLD AUTO: 1 %
BILIRUB SERPL-MCNC: 0.5 MG/DL (ref 0–1.2)
BUN SERPL-MCNC: 15 MG/DL (ref 8–27)
BUN/CREAT SERPL: 20 (ref 12–28)
CALCIUM SERPL-MCNC: 9.9 MG/DL (ref 8.7–10.3)
CHLORIDE SERPL-SCNC: 103 MMOL/L (ref 96–106)
CHOLEST SERPL-MCNC: 230 MG/DL (ref 100–199)
CO2 SERPL-SCNC: 25 MMOL/L (ref 20–29)
CREAT SERPL-MCNC: 0.74 MG/DL (ref 0.57–1)
EOSINOPHIL # BLD AUTO: 0.2 X10E3/UL (ref 0–0.4)
EOSINOPHIL NFR BLD AUTO: 3 %
ERYTHROCYTE [DISTWIDTH] IN BLOOD BY AUTOMATED COUNT: 14.3 % (ref 12.3–15.4)
EST. AVERAGE GLUCOSE BLD GHB EST-MCNC: 111 MG/DL
GLOBULIN SER CALC-MCNC: 2.4 G/DL (ref 1.5–4.5)
GLUCOSE SERPL-MCNC: 91 MG/DL (ref 65–99)
HBA1C MFR BLD: 5.5 % (ref 4.8–5.6)
HCT VFR BLD AUTO: 42.9 % (ref 34–46.6)
HDLC SERPL-MCNC: 69 MG/DL
HGB BLD-MCNC: 14.3 G/DL (ref 11.1–15.9)
IMM GRANULOCYTES # BLD: 0 X10E3/UL (ref 0–0.1)
IMM GRANULOCYTES NFR BLD: 0 %
LDLC SERPL CALC-MCNC: 137 MG/DL (ref 0–99)
LYMPHOCYTES # BLD AUTO: 1.5 X10E3/UL (ref 0.7–3.1)
LYMPHOCYTES NFR BLD AUTO: 23 %
MCH RBC QN AUTO: 29.3 PG (ref 26.6–33)
MCHC RBC AUTO-ENTMCNC: 33.3 G/DL (ref 31.5–35.7)
MCV RBC AUTO: 88 FL (ref 79–97)
MONOCYTES # BLD AUTO: 0.5 X10E3/UL (ref 0.1–0.9)
MONOCYTES NFR BLD AUTO: 8 %
NEUTROPHILS # BLD AUTO: 4.2 X10E3/UL (ref 1.4–7)
NEUTROPHILS NFR BLD AUTO: 65 %
PLATELET # BLD AUTO: 205 X10E3/UL (ref 150–379)
POTASSIUM SERPL-SCNC: 5 MMOL/L (ref 3.5–5.2)
PROT SERPL-MCNC: 6.9 G/DL (ref 6–8.5)
RBC # BLD AUTO: 4.88 X10E6/UL (ref 3.77–5.28)
SODIUM SERPL-SCNC: 141 MMOL/L (ref 134–144)
TRIGL SERPL-MCNC: 122 MG/DL (ref 0–149)
VLDLC SERPL CALC-MCNC: 24 MG/DL (ref 5–40)
WBC # BLD AUTO: 6.4 X10E3/UL (ref 3.4–10.8)

## 2018-08-31 DIAGNOSIS — R74.8 ELEVATED ALKALINE PHOSPHATASE LEVEL: Primary | ICD-10-CM

## 2018-11-21 ENCOUNTER — HOSPITAL ENCOUNTER (OUTPATIENT)
Dept: MAMMOGRAPHY | Age: 63
Discharge: HOME | End: 2018-11-21
Attending: INTERNAL MEDICINE
Payer: COMMERCIAL

## 2018-11-21 DIAGNOSIS — Z78.0 POSTMENOPAUSAL: ICD-10-CM

## 2018-11-21 DIAGNOSIS — M85.80 OSTEOPENIA, UNSPECIFIED LOCATION: ICD-10-CM

## 2018-11-21 DIAGNOSIS — Z12.31 VISIT FOR SCREENING MAMMOGRAM: ICD-10-CM

## 2018-11-21 PROCEDURE — 77063 BREAST TOMOSYNTHESIS BI: CPT

## 2018-11-21 PROCEDURE — 77080 DXA BONE DENSITY AXIAL: CPT

## 2019-08-20 RX ORDER — FLUTICASONE PROPIONATE 50 MCG
SPRAY, SUSPENSION (ML) NASAL
Qty: 48 G | Refills: 4 | Status: SHIPPED | OUTPATIENT
Start: 2019-08-20 | End: 2020-10-07

## 2019-09-12 ENCOUNTER — OFFICE VISIT (OUTPATIENT)
Dept: INTERNAL MEDICINE CLINIC | Age: 64
End: 2019-09-12

## 2019-09-12 VITALS
DIASTOLIC BLOOD PRESSURE: 64 MMHG | HEART RATE: 82 BPM | RESPIRATION RATE: 16 BRPM | OXYGEN SATURATION: 99 % | WEIGHT: 145 LBS | HEIGHT: 62 IN | SYSTOLIC BLOOD PRESSURE: 98 MMHG | TEMPERATURE: 97.9 F | BODY MASS INDEX: 26.68 KG/M2

## 2019-09-12 DIAGNOSIS — Z12.31 VISIT FOR SCREENING MAMMOGRAM: ICD-10-CM

## 2019-09-12 DIAGNOSIS — K21.9 GASTROESOPHAGEAL REFLUX DISEASE WITHOUT ESOPHAGITIS: ICD-10-CM

## 2019-09-12 DIAGNOSIS — Z00.00 ROUTINE GENERAL MEDICAL EXAMINATION AT A HEALTH CARE FACILITY: Primary | ICD-10-CM

## 2019-09-12 DIAGNOSIS — M85.80 OSTEOPENIA, UNSPECIFIED LOCATION: ICD-10-CM

## 2019-09-12 DIAGNOSIS — N89.8 VAGINAL DRYNESS: ICD-10-CM

## 2019-09-12 PROBLEM — E66.01 SEVERE OBESITY (BMI 35.0-39.9): Status: RESOLVED | Noted: 2018-08-16 | Resolved: 2019-09-12

## 2019-09-12 RX ORDER — METFORMIN HYDROCHLORIDE 500 MG/1
500 TABLET, EXTENDED RELEASE ORAL 3 TIMES DAILY
COMMUNITY
Start: 2019-09-10 | End: 2022-09-13 | Stop reason: SDUPTHER

## 2019-09-12 RX ORDER — ESOMEPRAZOLE MAGNESIUM 40 MG/1
CAPSULE, DELAYED RELEASE ORAL
Qty: 90 CAP | Refills: 3 | Status: SHIPPED | OUTPATIENT
Start: 2019-09-12 | End: 2020-08-12

## 2019-09-12 NOTE — PATIENT INSTRUCTIONS
I recommend 1200 mg calcium daily, preferably from calcium-rich or calcium-supplemented foods, and 1000 international units of vitamin D daily. You may need to add an additional calcium and vitamin D supplement to reach these goals. The main dietary sources of calcium include milk and other dairy products, such as cottage cheese, yogurt, or hard cheese, and green vegetables, such as kale and broccoli. A rough method of estimating dietary calcium intake is to multiply the number of dairy servings consumed each day by 300 mg. One serving is 8 oz of milk (236 mL) or yogurt (224 g), 1 oz (28 g) of hard cheese, or 16 oz (448 g) of cottage cheese.

## 2019-09-12 NOTE — PROGRESS NOTES
Subjective:   [facility] is here today for a full physical.      Health Maintenance  Immunizations:    Influenza: she will plan on getting it this fall. Tetanus: up to date. Gardasil: n/a. Cancer screening:    . S/p hyst  Breast: reviewed guidelines, reviewed SBE with her, UTD  reviewed guidelines, UTD  Colonoscopy and bone density are up-to-date. GERD: Taking medication daily and if misses dose has breakthrough gerd symptoms. Following gerd precautions  Osteopenia:  Taking calcium 1200 mg in diet as able and supplement and vit D 1000 iu,  last bone density reviewed. No falls this year. Exercise: none at this time. Vaginal dryness: intercourse in uncomfortable. Vagifem is helpful but she states forgets vagifem. She has lost 48 pounds and is on wt loss program [employer]. Patient Care Team:  Maury Prasad MD as PCP - General     The following sections were reviewed & updated as appropriate: PMH, PSH, FH, and SH. Patient Active Problem List   Diagnosis Code    Osteopenia M85.80    Allergic rhinitis, cause unspecified J30.9    GERD (gastroesophageal reflux disease) K21.9    Advance directive discussed with patient Z70.80    Anxiety F41.9          Prior to Admission medications    Medication Sig Start Date End Date Taking? Authorizing Provider   metFORMIN ER (GLUCOPHAGE XR) 500 mg tablet  9/10/19  Yes Provider, Historical   fluticasone propionate (FLONASE) 50 mcg/actuation nasal spray USE 2 SPRAYS IN EACH NOSTRIL DAILY 8/20/19  Yes Maury Prasad MD   esomeprazole (NEXIUM) 40 mg capsule TAKE 1 CAPSULE DAILY FOR GASTROESOPHAGEAL REFLUX DISEASE 8/16/18  Yes Maury Prasad MD   VIT C/VIT E/LUTEIN/MIN/OMEGA-3 (OCUVITE PO) Take  by mouth two (2) times a day. Yes Provider, Historical   LUTEIN PO Take 40 mg by mouth daily. Yes Provider, Historical   CALCIUM CITRATE/VITAMIN D3 (CITRACAL + D PO) Take  by mouth.    Yes Provider, Historical   latanoprost (XALATAN) 0.005 % ophthalmic solution Administer 1 Drop to right eye nightly. Yes Provider, Historical   dorzolamide-timolol (COSOPT) 2-0.5 % ophthalmic solution Administer 1 Drop to right eye two (2) times a day. Yes Provider, Historical   multivitamins-minerals-lutein (CENTRUM SILVER) Tab Take  by mouth. Yes Provider, Historical   GLUCOSAM & CHONDROIT-MV & MIN3 (GLUCOSAMINE &CHONDROIT-MV-MIN3 PO) Take  by mouth. Yes Provider, Historical   ascorbic acid (VITAMIN C) 1,000 mg tablet Take  by mouth. Yes Provider, Historical          No Known Allergies           Review of Systems   Constitutional: Negative for chills, fever and malaise/fatigue. HENT: Negative for congestion and sore throat. Eyes: Negative for blurred vision and double vision. Respiratory: Negative for cough, shortness of breath and wheezing. Cardiovascular: Negative for chest pain, palpitations and leg swelling. Gastrointestinal: Negative for abdominal pain, blood in stool, constipation, diarrhea, nausea and vomiting. Genitourinary: Negative for dysuria, frequency and urgency. Musculoskeletal: Negative for back pain, joint pain and myalgias. Skin: Negative for rash. Neurological: Negative for dizziness, sensory change, focal weakness and headaches. Psychiatric/Behavioral: Negative for depression. The patient is not nervous/anxious and does not have insomnia. Objective:   Physical Exam   Constitutional: She appears well-developed. No distress. HENT:   Head: Normocephalic and atraumatic. Right Ear: Tympanic membrane and ear canal normal.   Left Ear: Tympanic membrane and ear canal normal.   Nose: Nose normal.   Mouth/Throat: Oropharynx is clear and moist.   Eyes: Pupils are equal, round, and reactive to light. Conjunctivae and EOM are normal.   Neck: Normal range of motion. No thyromegaly present. Cardiovascular: Normal rate, regular rhythm, normal heart sounds and intact distal pulses. No murmur heard.   Pulmonary/Chest: Effort normal and breath sounds normal.   Breast exam: breasts appear normal, no suspicious masses, no skin or nipple changes or axillary nodes. Abdominal: Soft. Bowel sounds are normal. She exhibits no mass. There is no tenderness. Musculoskeletal: She exhibits no edema. Lymphadenopathy:     She has no cervical adenopathy. Neurological: She has normal strength. No cranial nerve deficit or sensory deficit. Skin: No rash noted. Psychiatric: She has a normal mood and affect. Nursing note and vitals reviewed. Visit Vitals  BP 98/64 (BP 1 Location: Right arm)   Pulse 82   Temp 97.9 °F (36.6 °C) (Oral)   Resp 16   Ht 5' 1.5\" (1.562 m)   Wt 145 lb (65.8 kg)   LMP 08/01/2004   SpO2 99%   BMI 26.95 kg/m²         Glynn Davis is a 59 y.o. female who was seen in clinic today (9/12/2019) for a full physical.       Assessment & Plan:   Encounter Diagnoses   Name Primary?  Routine general medical examination at a health care facility  Health maintenance reviewed and updated with patient today at visit. Yes    Gastroesophageal reflux disease without esophagitis  Well-controlled on Nexium. You trying to alternate her days of Nexium with over-the-counter Pepcid or Zantac to see if she is able to taper down. She does plan to try this.  Osteopenia, unspecified location  Counseled calcium, vitamin D, exercise recommendations     Visit for screening mammogram     Vaginal dryness  Review treatment options particularly over the lubricants such as uber. If this is not effective she will contact me about restarting Vagifem.       Orders Placed This Encounter    RAMEZ 3D JASON W MAMMO BI SCREENING INCL CAD    CBC WITH AUTOMATED DIFF    METABOLIC PANEL, COMPREHENSIVE    LIPID PANEL    TSH RFX ON ABNORMAL TO FREE T4   Follow-up in 1 year for physical.  Advised her to call back or return to office if symptoms worsen/change/persist.  Discussed expected course/resolution/complications of diagnosis in detail with patient. Medication risks/benefits/costs/interactions/alternatives discussed with patient. She was given an after visit summary which includes diagnoses, current medications, & vitals. She expressed understanding with the diagnosis and plan. Aspects of this note may have been generated using voice recognition software. Despite editing, there may be some syntax errors.        Maritza Kessler MD

## 2019-09-14 LAB
ALBUMIN SERPL-MCNC: 4.4 G/DL (ref 3.6–4.8)
ALBUMIN/GLOB SERPL: 2.3 {RATIO} (ref 1.2–2.2)
ALP SERPL-CCNC: 125 IU/L (ref 39–117)
ALT SERPL-CCNC: 14 IU/L (ref 0–32)
AST SERPL-CCNC: 13 IU/L (ref 0–40)
BASOPHILS # BLD AUTO: 0.1 X10E3/UL (ref 0–0.2)
BASOPHILS NFR BLD AUTO: 1 %
BILIRUB SERPL-MCNC: 0.4 MG/DL (ref 0–1.2)
BUN SERPL-MCNC: 17 MG/DL (ref 8–27)
BUN/CREAT SERPL: 25 (ref 12–28)
CALCIUM SERPL-MCNC: 9.6 MG/DL (ref 8.7–10.3)
CHLORIDE SERPL-SCNC: 102 MMOL/L (ref 96–106)
CHOLEST SERPL-MCNC: 179 MG/DL (ref 100–199)
CO2 SERPL-SCNC: 29 MMOL/L (ref 20–29)
CREAT SERPL-MCNC: 0.67 MG/DL (ref 0.57–1)
EOSINOPHIL # BLD AUTO: 0.3 X10E3/UL (ref 0–0.4)
EOSINOPHIL NFR BLD AUTO: 6 %
ERYTHROCYTE [DISTWIDTH] IN BLOOD BY AUTOMATED COUNT: 12.7 % (ref 12.3–15.4)
GLOBULIN SER CALC-MCNC: 1.9 G/DL (ref 1.5–4.5)
GLUCOSE SERPL-MCNC: 102 MG/DL (ref 65–99)
HCT VFR BLD AUTO: 41.8 % (ref 34–46.6)
HDLC SERPL-MCNC: 76 MG/DL
HGB BLD-MCNC: 14.1 G/DL (ref 11.1–15.9)
IMM GRANULOCYTES # BLD AUTO: 0 X10E3/UL (ref 0–0.1)
IMM GRANULOCYTES NFR BLD AUTO: 0 %
LDLC SERPL CALC-MCNC: 90 MG/DL (ref 0–99)
LYMPHOCYTES # BLD AUTO: 1.4 X10E3/UL (ref 0.7–3.1)
LYMPHOCYTES NFR BLD AUTO: 24 %
MCH RBC QN AUTO: 29.1 PG (ref 26.6–33)
MCHC RBC AUTO-ENTMCNC: 33.7 G/DL (ref 31.5–35.7)
MCV RBC AUTO: 86 FL (ref 79–97)
MONOCYTES # BLD AUTO: 0.5 X10E3/UL (ref 0.1–0.9)
MONOCYTES NFR BLD AUTO: 9 %
NEUTROPHILS # BLD AUTO: 3.6 X10E3/UL (ref 1.4–7)
NEUTROPHILS NFR BLD AUTO: 60 %
PLATELET # BLD AUTO: 255 X10E3/UL (ref 150–450)
POTASSIUM SERPL-SCNC: 5.1 MMOL/L (ref 3.5–5.2)
PROT SERPL-MCNC: 6.3 G/DL (ref 6–8.5)
RBC # BLD AUTO: 4.84 X10E6/UL (ref 3.77–5.28)
SODIUM SERPL-SCNC: 142 MMOL/L (ref 134–144)
TRIGL SERPL-MCNC: 63 MG/DL (ref 0–149)
TSH SERPL DL<=0.005 MIU/L-ACNC: 2.81 UIU/ML (ref 0.45–4.5)
VLDLC SERPL CALC-MCNC: 13 MG/DL (ref 5–40)
WBC # BLD AUTO: 5.9 X10E3/UL (ref 3.4–10.8)

## 2019-12-10 ENCOUNTER — HOSPITAL ENCOUNTER (OUTPATIENT)
Dept: MAMMOGRAPHY | Age: 64
Discharge: HOME | End: 2019-12-10
Attending: INTERNAL MEDICINE
Payer: COMMERCIAL

## 2019-12-10 DIAGNOSIS — Z12.31 VISIT FOR SCREENING MAMMOGRAM: ICD-10-CM

## 2019-12-10 PROCEDURE — 77063 BREAST TOMOSYNTHESIS BI: CPT

## 2020-08-12 RX ORDER — ESOMEPRAZOLE MAGNESIUM 40 MG/1
CAPSULE, DELAYED RELEASE ORAL
Qty: 90 CAP | Refills: 1 | Status: SHIPPED | OUTPATIENT
Start: 2020-08-12 | End: 2020-09-15 | Stop reason: SDUPTHER

## 2020-09-15 ENCOUNTER — OFFICE VISIT (OUTPATIENT)
Dept: INTERNAL MEDICINE CLINIC | Age: 65
End: 2020-09-15
Payer: COMMERCIAL

## 2020-09-15 VITALS
HEART RATE: 65 BPM | SYSTOLIC BLOOD PRESSURE: 125 MMHG | BODY MASS INDEX: 25.53 KG/M2 | OXYGEN SATURATION: 100 % | DIASTOLIC BLOOD PRESSURE: 81 MMHG | WEIGHT: 135.2 LBS | HEIGHT: 61 IN | TEMPERATURE: 97.8 F | RESPIRATION RATE: 16 BRPM

## 2020-09-15 DIAGNOSIS — M85.80 OSTEOPENIA, UNSPECIFIED LOCATION: ICD-10-CM

## 2020-09-15 DIAGNOSIS — M17.0 PRIMARY OSTEOARTHRITIS OF BOTH KNEES: ICD-10-CM

## 2020-09-15 DIAGNOSIS — Z78.0 POSTMENOPAUSAL: ICD-10-CM

## 2020-09-15 DIAGNOSIS — M54.50 CHRONIC BILATERAL LOW BACK PAIN WITHOUT SCIATICA: ICD-10-CM

## 2020-09-15 DIAGNOSIS — Z00.00 ROUTINE GENERAL MEDICAL EXAMINATION AT A HEALTH CARE FACILITY: Primary | ICD-10-CM

## 2020-09-15 DIAGNOSIS — G89.29 CHRONIC BILATERAL LOW BACK PAIN WITHOUT SCIATICA: ICD-10-CM

## 2020-09-15 DIAGNOSIS — Z12.31 VISIT FOR SCREENING MAMMOGRAM: ICD-10-CM

## 2020-09-15 DIAGNOSIS — Z23 NEEDS FLU SHOT: ICD-10-CM

## 2020-09-15 DIAGNOSIS — R73.09 ELEVATED GLUCOSE: ICD-10-CM

## 2020-09-15 DIAGNOSIS — K21.9 GASTROESOPHAGEAL REFLUX DISEASE WITHOUT ESOPHAGITIS: ICD-10-CM

## 2020-09-15 PROCEDURE — 99396 PREV VISIT EST AGE 40-64: CPT | Performed by: INTERNAL MEDICINE

## 2020-09-15 PROCEDURE — 90686 IIV4 VACC NO PRSV 0.5 ML IM: CPT

## 2020-09-15 PROCEDURE — 90471 IMMUNIZATION ADMIN: CPT

## 2020-09-15 RX ORDER — ESOMEPRAZOLE MAGNESIUM 40 MG/1
CAPSULE, DELAYED RELEASE ORAL
Qty: 90 CAP | Refills: 3 | Status: SHIPPED | OUTPATIENT
Start: 2020-09-15 | End: 2021-09-16 | Stop reason: SDUPTHER

## 2020-09-15 NOTE — PATIENT INSTRUCTIONS

## 2020-09-15 NOTE — PROGRESS NOTES
Subjective:   Ismael Austin is here today for a full physical.      Health Maintenance  Immunizations:    Influenza: will get this done today. Tetanus: up to date. .   Cancer screening:    S/p hyst and BSO. Breast: reviewed guidelines, reviewed SBE with her, UTD  Colonoscopy due 2028  Osteopenia:  Taking calcium and vit D,  last bone density reviewed. No falls. Exercise: Exercise: Wt and floor exercises and walking. GERD: Taking medication daily and if misses dose has breakthrough gerd symptoms. Following gerd precautions. Patient Care Team:  Jayla Ruby MD as PCP - [facility] Marquise Bran MD as PCP - [address] [place] [address] Dr Miles Provider     The following sections were reviewed & updated as appropriate: PMH, PSH, FH, and SH. Patient Active Problem List   Diagnosis Code    Osteopenia M85.80    Allergic rhinitis, cause unspecified J30.9    GERD (gastroesophageal reflux disease) K21.9    Advance directive discussed with patient Z70.80    Anxiety F41.9          Prior to Admission medications    Medication Sig Start Date End Date Taking? Authorizing Provider   esomeprazole (NEXIUM) 40 mg capsule TAKE 1 CAPSULE DAILY FOR GASTROESOPHAGEAL REFLUX DISEASE 8/12/20   Jayla Ruby MD   metFORMIN ER (GLUCOPHAGE XR) 500 mg tablet  9/10/19   Provider, Historical   fluticasone propionate (FLONASE) 50 mcg/actuation nasal spray USE 2 SPRAYS IN EACH NOSTRIL DAILY 8/20/19   Jayla Ruby MD   VIT C/VIT E/LUTEIN/MIN/OMEGA-3 (OCUVITE PO) Take  by mouth two (2) times a day. Provider, Historical   LUTEIN PO Take 40 mg by mouth daily. Provider, Historical   CALCIUM CITRATE/VITAMIN D3 (CITRACAL + D PO) Take  by mouth. Provider, Historical   latanoprost (XALATAN) 0.005 % ophthalmic solution Administer 1 Drop to right eye nightly. Provider, Historical   dorzolamide-timolol (COSOPT) 2-0.5 % ophthalmic solution Administer 1 Drop to right eye two (2) times a day.     Provider, Historical   multivitamins-minerals-lutein (CENTRUM SILVER) Tab Take  by mouth. Provider, Historical   GLUCOSAM & CHONDROIT-MV & MIN3 (GLUCOSAMINE &CHONDROIT-MV-MIN3 PO) Take  by mouth. Provider, Historical   ascorbic acid (VITAMIN C) 1,000 mg tablet Take  by mouth. Provider, Historical          No Known Allergies           Review of Systems   Constitutional: Negative for chills, fever and malaise/fatigue. HENT: Negative for congestion and sore throat. Eyes: Negative for blurred vision. Respiratory: Negative for cough, shortness of breath and wheezing. Cardiovascular: Negative for chest pain, palpitations and leg swelling. Gastrointestinal: Negative for abdominal pain, blood in stool, constipation, diarrhea, nausea and vomiting. Genitourinary: Negative for dysuria, frequency and urgency. Musculoskeletal: Positive for back pain (low back pain on and off particularly after yard work. ) and joint pain (bilateral knee stiffness and on and off knee pain. ). Negative for myalgias. Skin: Negative for rash. Neurological: Negative for dizziness, sensory change, focal weakness and headaches. Psychiatric/Behavioral: Negative for depression. The patient is not nervous/anxious. Objective:   Physical Exam  Vitals signs and nursing note reviewed. Constitutional:       General: She is not in acute distress. Appearance: She is well-developed. HENT:      Head: Normocephalic and atraumatic. Right Ear: Tympanic membrane and ear canal normal.      Left Ear: Tympanic membrane and ear canal normal.      Nose: Nose normal.   Eyes:      Conjunctiva/sclera: Conjunctivae normal.      Pupils: Pupils are equal, round, and reactive to light. Neck:      Musculoskeletal: Normal range of motion. Thyroid: No thyromegaly. Cardiovascular:      Rate and Rhythm: Normal rate and regular rhythm. Heart sounds: Normal heart sounds. No murmur.    Pulmonary:      Effort: Pulmonary effort is normal.      Breath sounds: Normal breath sounds. Chest:      Comments: Breast exam: breasts appear normal, no suspicious masses, no skin or nipple changes or axillary nodes. Abdominal:      General: Bowel sounds are normal.      Palpations: Abdomen is soft. There is no mass. Tenderness: There is no abdominal tenderness. Musculoskeletal:      Right knee: She exhibits normal range of motion (crepitus with ROM). No tenderness found. Left knee: She exhibits normal range of motion (crepitus with ROM). No tenderness found. Lumbar back: She exhibits no tenderness. Right lower leg: No edema. Left lower leg: No edema. Lymphadenopathy:      Cervical: No cervical adenopathy. Skin:     Findings: No rash. Neurological:      Cranial Nerves: No cranial nerve deficit. Sensory: No sensory deficit. Visit Vitals  /81 (BP 1 Location: Right arm, BP Patient Position: Sitting)   Pulse 65   Temp 97.8 °F (36.6 °C) (Temporal)   Resp 16   Ht 5' 1.02\" (1.55 m)   Wt 135 lb 3.2 oz (61.3 kg)   LMP 08/01/2004   SpO2 100%   BMI 25.53 kg/m²         Darrian Desai is a 62 y.o. female who was seen in clinic today (9/15/2020) for a full physical.       Assessment & Plan:   Encounter Diagnoses   Name Primary?  Routine general medical examination at a health care facility  Well controlled, continue current medication. Yes    Elevated glucose  low sugar, low carbohydrate diet,  and exercise.  Chronic bilateral low back pain without sciatica  Given h/o with exercise for back health     Primary osteoarthritis of both knees  mild     Gastroesophageal reflux disease without esophagitis  Well controlled, continue current medication.  Needs flu shot     Visit for screening mammogram     Osteopenia, unspecified location  Ca vit D and exercise.       Postmenopausal      Orders Placed This Encounter    DEXA BONE DENSITY STUDY AXIAL    [facility] 3D JASON W MAMMO BI SCREENING INCL CAD    INFLUENZA VIRUS VACCINE QUADRIVALENT, PRESERVATIVE FREE SYRINGE ([id number])    LIPID PANEL    METABOLIC PANEL, COMPREHENSIVE    CBC WITH AUTOMATED DIFF    TSH REFLEX TO T4    HEMOGLOBIN A1C WITH EAG    esomeprazole (NEXIUM) 40 mg capsule   . follow up 1 year physical.     Omega Méndez her to call back or return to office if symptoms worsen/change/persist.  Discussed expected course/resolution/complications of diagnosis in detail with patient. Medication risks/benefits/costs/interactions/alternatives discussed with patient. She was given an after visit summary which includes diagnoses, current medications, & vitals. She expressed understanding with the diagnosis and plan. Aspects of this note may have been generated using voice recognition software. Despite editing, there may be some syntax errors.        Mollie Osler, MD

## 2020-09-15 NOTE — PROGRESS NOTES
Nila Fontenot  is a 62 y.o. female  who present for routine immunizations. Prior to vaccine administration: Consent was obtained. Risks and adverse reactions were discussed. The patient was provided the VIS and they were given an opportunity to ask questions; all questions were addressed. She  denies any symptoms, reactions or allergies that would exclude them from being immunized today. There were no adverse reactions observed post vaccination. Patient was advised to seek medical or call the office with any questions or concerns post vaccination. Patient verbalized understanding.    Dary Robin LPN

## 2020-09-23 LAB
ALBUMIN SERPL-MCNC: 4.4 G/DL (ref 3.8–4.8)
ALBUMIN/GLOB SERPL: 1.9 {RATIO} (ref 1.2–2.2)
ALP SERPL-CCNC: 101 IU/L (ref 39–117)
ALT SERPL-CCNC: 10 IU/L (ref 0–32)
AST SERPL-CCNC: 14 IU/L (ref 0–40)
BASOPHILS # BLD AUTO: 0.1 X10E3/UL (ref 0–0.2)
BASOPHILS NFR BLD AUTO: 1 %
BILIRUB SERPL-MCNC: 0.5 MG/DL (ref 0–1.2)
BUN SERPL-MCNC: 16 MG/DL (ref 8–27)
BUN/CREAT SERPL: 26 (ref 12–28)
CALCIUM SERPL-MCNC: 9.4 MG/DL (ref 8.7–10.3)
CHLORIDE SERPL-SCNC: 103 MMOL/L (ref 96–106)
CHOLEST SERPL-MCNC: 165 MG/DL (ref 100–199)
CO2 SERPL-SCNC: 25 MMOL/L (ref 20–29)
CREAT SERPL-MCNC: 0.61 MG/DL (ref 0.57–1)
EOSINOPHIL # BLD AUTO: 0.3 X10E3/UL (ref 0–0.4)
EOSINOPHIL NFR BLD AUTO: 5 %
ERYTHROCYTE [DISTWIDTH] IN BLOOD BY AUTOMATED COUNT: 13 % (ref 11.7–15.4)
EST. AVERAGE GLUCOSE BLD GHB EST-MCNC: 97 MG/DL
GLOBULIN SER CALC-MCNC: 2.3 G/DL (ref 1.5–4.5)
GLUCOSE SERPL-MCNC: 102 MG/DL (ref 65–99)
HBA1C MFR BLD: 5 % (ref 4.8–5.6)
HCT VFR BLD AUTO: 43.2 % (ref 34–46.6)
HDLC SERPL-MCNC: 69 MG/DL
HGB BLD-MCNC: 14.1 G/DL (ref 11.1–15.9)
IMM GRANULOCYTES # BLD AUTO: 0 X10E3/UL (ref 0–0.1)
IMM GRANULOCYTES NFR BLD AUTO: 0 %
LDLC SERPL CALC-MCNC: 86 MG/DL (ref 0–99)
LYMPHOCYTES # BLD AUTO: 1.5 X10E3/UL (ref 0.7–3.1)
LYMPHOCYTES NFR BLD AUTO: 28 %
MCH RBC QN AUTO: 29.4 PG (ref 26.6–33)
MCHC RBC AUTO-ENTMCNC: 32.6 G/DL (ref 31.5–35.7)
MCV RBC AUTO: 90 FL (ref 79–97)
MONOCYTES # BLD AUTO: 0.4 X10E3/UL (ref 0.1–0.9)
MONOCYTES NFR BLD AUTO: 7 %
NEUTROPHILS # BLD AUTO: 3.2 X10E3/UL (ref 1.4–7)
NEUTROPHILS NFR BLD AUTO: 59 %
PLATELET # BLD AUTO: 238 X10E3/UL (ref 150–450)
POTASSIUM SERPL-SCNC: 4.8 MMOL/L (ref 3.5–5.2)
PROT SERPL-MCNC: 6.7 G/DL (ref 6–8.5)
RBC # BLD AUTO: 4.79 X10E6/UL (ref 3.77–5.28)
SODIUM SERPL-SCNC: 142 MMOL/L (ref 134–144)
TRIGL SERPL-MCNC: 49 MG/DL (ref 0–149)
TSH SERPL DL<=0.005 MIU/L-ACNC: 1.82 UIU/ML (ref 0.45–4.5)
VLDLC SERPL CALC-MCNC: 10 MG/DL (ref 5–40)
WBC # BLD AUTO: 5.4 X10E3/UL (ref 3.4–10.8)

## 2020-09-23 NOTE — PROGRESS NOTES
My chart message sent regarding slightly elevated BS and normal hemoglobin A1c and recommend recheck BS in 6 months.

## 2020-11-11 ENCOUNTER — HOSPITAL ENCOUNTER (OUTPATIENT)
Dept: MAMMOGRAPHY | Age: 65
Discharge: HOME | End: 2020-11-11
Payer: COMMERCIAL

## 2020-11-11 ENCOUNTER — HOSPITAL ENCOUNTER (OUTPATIENT)
Dept: MAMMOGRAPHY | Age: 65
Discharge: HOME | End: 2020-11-11
Attending: INTERNAL MEDICINE
Payer: COMMERCIAL

## 2020-11-11 DIAGNOSIS — Z78.0 POSTMENOPAUSAL: ICD-10-CM

## 2020-11-11 DIAGNOSIS — Z12.31 VISIT FOR SCREENING MAMMOGRAM: ICD-10-CM

## 2020-11-11 PROCEDURE — 77080 DXA BONE DENSITY AXIAL: CPT

## 2020-11-11 PROCEDURE — 77063 BREAST TOMOSYNTHESIS BI: CPT

## 2021-09-16 ENCOUNTER — OFFICE VISIT (OUTPATIENT)
Dept: INTERNAL MEDICINE CLINIC | Age: 66
End: 2021-09-16
Payer: MEDICARE

## 2021-09-16 VITALS
WEIGHT: 139.4 LBS | SYSTOLIC BLOOD PRESSURE: 112 MMHG | TEMPERATURE: 97.8 F | BODY MASS INDEX: 26.32 KG/M2 | HEIGHT: 61 IN | DIASTOLIC BLOOD PRESSURE: 74 MMHG | OXYGEN SATURATION: 97 % | HEART RATE: 77 BPM | RESPIRATION RATE: 16 BRPM

## 2021-09-16 DIAGNOSIS — Z12.31 VISIT FOR SCREENING MAMMOGRAM: ICD-10-CM

## 2021-09-16 DIAGNOSIS — M85.80 OSTEOPENIA, UNSPECIFIED LOCATION: ICD-10-CM

## 2021-09-16 DIAGNOSIS — M70.62 TROCHANTERIC BURSITIS OF LEFT HIP: ICD-10-CM

## 2021-09-16 DIAGNOSIS — Z23 ENCOUNTER FOR IMMUNIZATION: ICD-10-CM

## 2021-09-16 DIAGNOSIS — Z00.00 ROUTINE GENERAL MEDICAL EXAMINATION AT A HEALTH CARE FACILITY: Primary | ICD-10-CM

## 2021-09-16 DIAGNOSIS — K21.9 GASTROESOPHAGEAL REFLUX DISEASE WITHOUT ESOPHAGITIS: ICD-10-CM

## 2021-09-16 PROCEDURE — G8427 DOCREV CUR MEDS BY ELIG CLIN: HCPCS | Performed by: INTERNAL MEDICINE

## 2021-09-16 PROCEDURE — G0402 INITIAL PREVENTIVE EXAM: HCPCS | Performed by: INTERNAL MEDICINE

## 2021-09-16 PROCEDURE — G8419 CALC BMI OUT NRM PARAM NOF/U: HCPCS | Performed by: INTERNAL MEDICINE

## 2021-09-16 PROCEDURE — G8510 SCR DEP NEG, NO PLAN REQD: HCPCS | Performed by: INTERNAL MEDICINE

## 2021-09-16 PROCEDURE — 93000 ELECTROCARDIOGRAM COMPLETE: CPT | Performed by: INTERNAL MEDICINE

## 2021-09-16 RX ORDER — ESOMEPRAZOLE MAGNESIUM 40 MG/1
CAPSULE, DELAYED RELEASE ORAL
Qty: 90 CAPSULE | Refills: 3 | Status: SHIPPED | OUTPATIENT
Start: 2021-09-16 | End: 2022-09-13

## 2021-09-16 NOTE — ACP (ADVANCE CARE PLANNING)
End of Life Planning: patient counseled on use and need of an Advanced Directive. .  Patient asked to provide us with a copy once it is completed.

## 2021-09-16 NOTE — PROGRESS NOTES
This is a \"Welcome to [employer]"  Initial Preventive Physical Examination (IPPE) providing Personalized Prevention Plan Services (Performed in the first 12 months of enrollment)    I have reviewed the patient's medical history in detail and updated the computerized patient record. Assessment/Plan   Education and counseling provided:  Are appropriate based on today's review and evaluation    1. Routine general medical examination at a health care facility  Well controlled, continue current medication. Reviewed vaccine recommendations  -     CBC WITH AUTOMATED DIFF; Future  -     LIPID PANEL; Future  -     METABOLIC PANEL, COMPREHENSIVE; Future  -     AMB POC EKG ROUTINE W/ 12 LEADS, INTER & REP  2. Visit for screening mammogram  -     [facility] 3D JASON W MAMMO BI SCREENING INCL CAD; Future  3. Gastroesophageal reflux disease without esophagitis  Well-controlled as long as she takes her daily PPI  4. Osteopenia, unspecified location  Continue calcium and vitamin D, and weight bearing exercises  5. Left hip bursitis intermittent and triggered by increased walking. Counseled and given handout for stretching and management of flares. Depression Risk Screen     3 most recent PHQ Screens 9/16/2021   Little interest or pleasure in doing things Not at all   Feeling down, depressed, irritable, or hopeless Not at all   Total Score PHQ 2 0   she denies depression but had some down moments last week due to her [place] trip being postponed. Alcohol Risk Screen   Do you average more than 1 drink per night or more than 7 drinks a week?: (P) No  On any one occasion in the past three months have you had more than 3 drinks containing alcohol?: (P) No            Functional Ability and Level of Safety   Diet:  Diet: (P) No special diet     Hearing:  Hearing: (P) Patient reports hearing is good      Vision:  Vision: (P) fair     Activities of Daily Living:   The home contains: (P) grab bars  Functional ADLs: (P) Patient does total self care     Ambulation:  Patient ambulates: (P) with no difficulty     Exercise:  Exercise: (P) moderately active     Fall Risk Screen:  Fall Risk Assessment, last 12 mths 9/16/2021   Able to walk? Yes   Fall in past 12 months? 0   Do you feel unsteady? 0   Are you worried about falling 0     Abuse Screen:  Do you ever feel afraid of your partner?: (P) No  Are you in a relationship with someone who physically or mentally threatens you?: (P) No  Is it safe for you to go home?: (P) Yes        Screening EKG   EKG order placed: Yes    End of Life Planning   Advanced care planning directives were discussed with the patient and /or family/caregiver. Health Maintenance Due     Health Maintenance Due   Topic Date Due    Flu Vaccine (1) 09/01/2021       Patient Care Team   Patient Care Team:  Valencia Hodgkins, MD as PCP - General  Tenzin Ascencio MD as PCP - [facility] Aristeo Miles Provider  Review of Systems   Constitutional: Negative for chills, fever and malaise/fatigue. HENT: Negative for congestion and sore throat. Eyes: Positive for blurred vision (signficant decreased vision in right eye. ). Respiratory: Negative for cough, shortness of breath and wheezing. Cardiovascular: Negative for chest pain and leg swelling. Gastrointestinal: Negative for abdominal pain, blood in stool, constipation, diarrhea, heartburn, nausea and vomiting. Genitourinary: Negative for dysuria, frequency and urgency. Musculoskeletal: Positive for joint pain (left hip on and off if does a lot of walking. ). Negative for myalgias. Skin: Negative for rash. Neurological: Negative for dizziness, sensory change, focal weakness and headaches. Psychiatric/Behavioral: Negative for depression.      Visit Vitals  /74 (BP 1 Location: Right arm, BP Patient Position: Sitting, BP Cuff Size: Large adult)   Pulse 77   Temp 97.8 °F (36.6 °C) (Temporal)   Resp 16   Ht 5' 1.26\" (1.556 m)   Wt 139 lb 6.4 oz (63.2 kg)   LMP 08/01/2004   SpO2 97%   BMI 26.12 kg/m²     GENERAL:  Pleasant female in no apparent distress. HEENT:  Normocephalic, atraumatic. Sinuses nontender. Posterior Pharynx clear, no erythema. NECK:  Supple, full range of motion. No thyromegaly or adenopathy. No carotid bruits auscultated. BACK:  Nontender. RESPIRATORY:  Lungs clear to auscultation bilaterally without wheezes or crackles. CARDIAC:  Regular rate and rhythm, no murmurs, rubs or gallops. BREASTS:  No dimples, retraction, color changes, nipple discharge, or masses present. No supra- or infraclavicular or axillary nodes palpable. ABDOMEN:  Soft, nontender. Positive bowel sounds. No masses. No hepatosplenomegaly. EXTREMITIES: hip exam nl. Peripheral pulses are symmetric and palpable. No cyanosis, clubbing, or edema. NEUROLOGIC:  Nonfocal.    SKIN: Normal appearance.    History     Past Medical History:   Diagnosis Date    Allergic rhinitis     Anxiety 7/19/2016    Diverticulosis     GERD (gastroesophageal reflux disease)     Glaucoma     Macular degeneration     Osteopenia     S/P colonoscopy 2008    diverticuli      Past Surgical History:   Procedure Laterality Date    ENDOSCOPY, COLON, DIAGNOSTIC  January 2008    HX BREAST BIOPSY Right 1993    neg    HX CYST INCISION AND DRAINAGE Left 2001    neg    HX ORTHOPAEDIC  2002    ganglion cyst    HX REFRACTIVE SURGERY  1999    with Dr. Arias Walker , subsequently she had a right amcular crack and has been treated by Dr. Bari Eisenmenger    breast biopsy    [facility] [address]    breast cyst aspiration     Current Outpatient Medications   Medication Sig Dispense Refill    fluticasone propionate (FLONASE) 50 mcg/actuation nasal spray USE 2 SPRAYS IN EACH NOSTRIL DAILY 48 g 3    esomeprazole (NEXIUM) 40 mg capsule TAKE 1 CAPSULE DAILY FOR GASTROESOPHAGEAL REFLUX DISEASE 90 Cap 3    metFORMIN ER (GLUCOPHAGE XR) 500 mg tablet Take 500 mg by mouth three (3) times daily.  VIT C/VIT E/LUTEIN/MIN/OMEGA-3 (OCUVITE PO) Take  by mouth two (2) times a day.  LUTEIN PO Take 40 mg by mouth daily.  CALCIUM CITRATE/VITAMIN D3 (CITRACAL + D PO) Take  by mouth.  latanoprost (XALATAN) 0.005 % ophthalmic solution Administer 1 Drop to right eye nightly.  dorzolamide-timolol (COSOPT) 2-0.5 % ophthalmic solution Administer 1 Drop to right eye two (2) times a day.  multivitamins-minerals-lutein (CENTRUM SILVER) Tab Take  by mouth.  GLUCOSAM & CHONDROIT-MV & MIN3 (GLUCOSAMINE &CHONDROIT-MV-MIN3 PO) Take  by mouth.  ascorbic acid (VITAMIN C) 1,000 mg tablet Take  by mouth. No Known Allergies    Family History   Problem Relation Age of Onset    Other Mother         Peptic ulcer disease    Osteoporosis Mother     Hypertension Mother         pulmonary hypertension    Lung Disease Mother 80        pulmonary hypertension    Cancer Maternal Aunt         breast cancer    Breast Problems Maternal Aunt     Diabetes Paternal Grandmother      Social History     Tobacco Use    Smoking status: Never Smoker    Smokeless tobacco: Never Used   Substance Use Topics    Alcohol use:  Yes     Alcohol/week: 0.0 standard drinks     Comment: 3 drinks per year       Anthony Gallegos MD

## 2021-09-16 NOTE — PATIENT INSTRUCTIONS
Medicare Wellness Visit, Female     The best way to live healthy is to have a lifestyle where you eat a well-balanced diet, exercise regularly, limit alcohol use, and quit all forms of tobacco/nicotine, if applicable. Regular preventive services are another way to keep healthy. Preventive services (vaccines, screening tests, monitoring & exams) can help personalize your care plan, which helps you manage your own care. Screening tests can find health problems at the earliest stages, when they are easiest to treat. [facility] follows the current, evidence-based guidelines published by the [facility] ([facility]) when recommending preventive services for our patients. Because we follow these guidelines, sometimes recommendations change over time as research supports it. (For example, mammograms used to be recommended annually. Even though Medicare will still pay for an annual mammogram, the newer guidelines recommend a mammogram every two years for women of average risk). Of course, you and your doctor may decide to screen more often for some diseases, based on your risk and your co-morbidities (chronic disease you are already diagnosed with). Preventive services for you include:  - Medicare offers their members a free annual wellness visit, which is time for you and your primary care provider to discuss and plan for your preventive service needs. Take advantage of this benefit every year!  -All adults over the age of 72 should receive the recommended pneumonia vaccines. Current USPSTF guidelines recommend a series of two vaccines for the best pneumonia protection.   -All adults should have a flu vaccine yearly and a tetanus vaccine every 10 years.   -All adults age 48 and older should receive the shingles vaccines (series of two vaccines).       -All adults age 38-68 who are overweight should have a diabetes screening test once every three years.   -All adults born between 80 and 1965 should be screened once for Hepatitis C.  -Other screening tests and preventive services for persons with diabetes include: an eye exam to screen for diabetic retinopathy, a kidney function test, a foot exam, and stricter control over your cholesterol.   -Cardiovascular screening for adults with routine risk involves an electrocardiogram (ECG) at intervals determined by your doctor.   -Colorectal cancer screenings should be done for adults age 54-65 with no increased risk factors for colorectal cancer. There are a number of acceptable methods of screening for this type of cancer. Each test has its own benefits and drawbacks. Discuss with your doctor what is most appropriate for you during your annual wellness visit. The different tests include: colonoscopy (considered the best screening method), a fecal occult blood test, a fecal DNA test, and sigmoidoscopy.    -A bone mass density test is recommended when a woman turns 65 to screen for osteoporosis. This test is only recommended one time, as a screening. Some providers will use this same test as a disease monitoring tool if you already have osteoporosis. -Breast cancer screenings are recommended every other year for women of normal risk, age 54-69.  -Cervical cancer screenings for women over age 72 are only recommended with certain risk factors. Here is a list of your current Health Maintenance items (your personalized list of preventive services) with a due date:  Health Maintenance Due   Topic Date Due    Yearly Flu Vaccine (1) 09/01/2021            Hip Bursitis: Care Instructions  Your Care Instructions     Bursitis is inflammation of the bursa. A bursa is a small sac of fluid that cushions a joint and helps it move easily. A bursa sits between a bone in the hip and the muscles and tendons in the thigh and buttock. Injury or overuse of the hip can cause bursitis.  Activities that can lead to bursitis include twisting and rapid joint movement. Bursitis can cause hip pain. Bursitis usually gets better if you avoid the activity that caused it. If pain lasts or gets worse despite home treatment, your doctor may draw fluid from the bursa through a needle. This may relieve your pain and help your doctor know if you have an infection. If so, your doctor will prescribe antibiotics. If you have inflammation only, you may get a corticosteroid shot to reduce swelling and pain. Sometimes surgery is needed to drain or remove the bursa. Follow-up care is a key part of your treatment and safety. Be sure to make and go to all appointments, and call your doctor if you are having problems. It's also a good idea to know your test results and keep a list of the medicines you take. How can you care for yourself at home? · Put ice or a cold pack on your hip for 10 to 20 minutes at a time. Put a thin cloth between the ice and your skin. · After 3 days of using ice, you may use heat on your hip. You can use a hot water bottle, a heating pad set on low, or a warm, moist towel. · Rest your hip. Stop any activities that cause pain. Switch to activities that do not stress your hip. · Take your medicines exactly as prescribed. Call your doctor if you think you are having a problem with your medicine. · Ask your doctor if you can take an over-the-counter pain medicine, such as acetaminophen (Tylenol), ibuprofen (Advil, Motrin), or naproxen (Aleve). Be safe with medicines. Read and follow all instructions on the label. · To prevent stiffness, gently move the hip joint as much as you can without pain every day. As the pain gets better, keep doing range-of-motion exercises. Ask your doctor for exercises that will make the muscles around the hip joint stronger. Do these as directed. · You can slowly return to the activity that caused the pain, but do it with less effort until you can do it without pain or swelling.  Be sure to warm up before and stretch after you do the activity. When should you call for help? Call your doctor now or seek immediate medical care if:    · You have a fever.     · You have increased swelling or redness in your hip.     · You cannot use your hip, or the pain in your hip gets worse. Watch closely for changes in your health, and be sure to contact your doctor if:    · You have pain for 2 weeks or longer despite home treatment. Where can you learn more? Go to http://www.gray.com/  Enter I156 in the search box to learn more about \"Hip Bursitis: Care Instructions. \"  Current as of: July 1, 2021               Content Version: 13.0  © 0897-2000 [employer]. Care instructions adapted under license by [employer] (which disclaims liability or warranty for this information). If you have questions about a medical condition or this instruction, always ask your healthcare professional. [place] [address] any warranty or liability for your use of this information. Hip Bursitis: Exercises  Introduction  Here are some examples of exercises for you to try. The exercises may be suggested for a condition or for rehabilitation. Start each exercise slowly. Ease off the exercises if you start to have pain. You will be told when to start these exercises and which ones will work best for you. How to do the exercises  Hip rotator stretch    1. Lie on your back with both knees bent and your feet flat on the floor. 2. Put the ankle of your affected leg on your opposite thigh near your knee. 3. Use your hand to gently push your knee away from your body until you feel a gentle stretch around your hip. 4. Hold the stretch for 15 to 30 seconds. 5. Repeat 2 to 4 times. 6. Repeat steps 1 through 5, but this time use your hand to gently pull your knee toward your opposite shoulder. Iliotibial band stretch    1. Lean sideways against a wall.  If you are not steady on your feet, hold on to a chair or counter. 2. Stand on the leg with the affected hip, with that leg close to the wall. Then cross your other leg in front of it. 3. Let your affected hip drop out to the side of your body and against wall. Then lean away from your affected hip until you feel a stretch. 4. Hold the stretch for 15 to 30 seconds. 5. Repeat 2 to 4 times. Straight-leg raises to the outside    1. Lie on your side, with your affected hip on top. 2. Tighten the front thigh muscles of your top leg to keep your knee straight. 3. Keep your hip and your leg straight in line with the rest of your body, and keep your knee pointing forward. Do not drop your hip back. 4. Lift your top leg straight up toward the ceiling, about 12 inches off the floor. Hold for about 6 seconds, then slowly lower your leg. 5. Repeat 8 to 12 times. Clamshell    1. Lie on your side, with your affected hip on top and your head propped on a pillow. Keep your feet and knees together and your knees bent. 2. Raise your top knee, but keep your feet together. Do not let your hips roll back. Your legs should open up like a clamshell. 3. Hold for 6 seconds. 4. Slowly lower your knee back down. Rest for 10 seconds. 5. Repeat 8 to 12 times. Follow-up care is a key part of your treatment and safety. Be sure to make and go to all appointments, and call your doctor if you are having problems. It's also a good idea to know your test results and keep a list of the medicines you take. Where can you learn more? Go to http://www.Gogo.com/  Enter H674 in the search box to learn more about \"Hip Bursitis: Exercises. \"  Current as of: July 1, 2021               Content Version: 13.0  © 3749-7287 [employer]. Care instructions adapted under license by [employer] (which disclaims liability or warranty for this information).  If you have questions about a medical condition or this instruction, always ask your healthcare professional. [address] any warranty or liability for your use of this information.

## 2021-09-18 LAB
ALBUMIN SERPL-MCNC: 4.2 G/DL (ref 3.8–4.8)
ALBUMIN/GLOB SERPL: 2 {RATIO} (ref 1.2–2.2)
ALP SERPL-CCNC: 135 IU/L (ref 44–121)
ALT SERPL-CCNC: 21 IU/L (ref 0–32)
AST SERPL-CCNC: 12 IU/L (ref 0–40)
BASOPHILS # BLD AUTO: 0.1 X10E3/UL (ref 0–0.2)
BASOPHILS NFR BLD AUTO: 1 %
BILIRUB SERPL-MCNC: 0.3 MG/DL (ref 0–1.2)
BUN SERPL-MCNC: 18 MG/DL (ref 8–27)
BUN/CREAT SERPL: 27 (ref 12–28)
CALCIUM SERPL-MCNC: 9.7 MG/DL (ref 8.7–10.3)
CHLORIDE SERPL-SCNC: 106 MMOL/L (ref 96–106)
CHOLEST SERPL-MCNC: 186 MG/DL (ref 100–199)
CO2 SERPL-SCNC: 27 MMOL/L (ref 20–29)
CREAT SERPL-MCNC: 0.66 MG/DL (ref 0.57–1)
EOSINOPHIL # BLD AUTO: 0.3 X10E3/UL (ref 0–0.4)
EOSINOPHIL NFR BLD AUTO: 5 %
ERYTHROCYTE [DISTWIDTH] IN BLOOD BY AUTOMATED COUNT: 13.1 % (ref 11.7–15.4)
GLOBULIN SER CALC-MCNC: 2.1 G/DL (ref 1.5–4.5)
GLUCOSE SERPL-MCNC: 105 MG/DL (ref 65–99)
HCT VFR BLD AUTO: 41.6 % (ref 34–46.6)
HDLC SERPL-MCNC: 73 MG/DL
HGB BLD-MCNC: 14 G/DL (ref 11.1–15.9)
IMM GRANULOCYTES # BLD AUTO: 0 X10E3/UL (ref 0–0.1)
IMM GRANULOCYTES NFR BLD AUTO: 0 %
LDLC SERPL CALC-MCNC: 101 MG/DL (ref 0–99)
LYMPHOCYTES # BLD AUTO: 1.6 X10E3/UL (ref 0.7–3.1)
LYMPHOCYTES NFR BLD AUTO: 27 %
MCH RBC QN AUTO: 29.4 PG (ref 26.6–33)
MCHC RBC AUTO-ENTMCNC: 33.7 G/DL (ref 31.5–35.7)
MCV RBC AUTO: 87 FL (ref 79–97)
MONOCYTES # BLD AUTO: 0.6 X10E3/UL (ref 0.1–0.9)
MONOCYTES NFR BLD AUTO: 9 %
NEUTROPHILS # BLD AUTO: 3.5 X10E3/UL (ref 1.4–7)
NEUTROPHILS NFR BLD AUTO: 58 %
PLATELET # BLD AUTO: 233 X10E3/UL (ref 150–450)
POTASSIUM SERPL-SCNC: 5.3 MMOL/L (ref 3.5–5.2)
PROT SERPL-MCNC: 6.3 G/DL (ref 6–8.5)
RBC # BLD AUTO: 4.77 X10E6/UL (ref 3.77–5.28)
SODIUM SERPL-SCNC: 143 MMOL/L (ref 134–144)
TRIGL SERPL-MCNC: 64 MG/DL (ref 0–149)
VLDLC SERPL CALC-MCNC: 12 MG/DL (ref 5–40)
WBC # BLD AUTO: 6 X10E3/UL (ref 3.4–10.8)

## 2021-09-24 DIAGNOSIS — E87.5 HYPERKALEMIA: Primary | ICD-10-CM

## 2021-09-24 NOTE — PROGRESS NOTES
Notified patient per provider results. Patient verbalized understanding. Patient confirmed not taking and potassium supplements.  Faxing lab slip to [facility].

## 2021-09-24 NOTE — PROGRESS NOTES
Notify patient her potassium is very slightly elevated. Confirm that she is not taking any potassium supplements. Plan to repeat her potassium in a week. Please make sure he drinks plenty of water prior to that lab work. Otherwise lab work shows slightly increased blood glucose. Cholesterol overall good but higher than last check. Continue with a diet low in sugar/carbohydrates/cholesterol/fat. Continue exercising.

## 2021-10-02 LAB — POTASSIUM SERPL-SCNC: 5.5 MMOL/L (ref 3.5–5.2)

## 2021-10-07 NOTE — PROGRESS NOTES
Notify patient that her potassium remains mildly elevated and I am not certain why at this time. We will plan to repeat her potassium in 1 month. Increase water intake and avoid any vitamin with potassium supplement.

## 2021-10-11 ENCOUNTER — TELEPHONE (OUTPATIENT)
Dept: INTERNAL MEDICINE CLINIC | Age: 66
End: 2021-10-11

## 2021-10-11 NOTE — TELEPHONE ENCOUNTER
Reason for call:  Patient states she is returning a call from [facility] from last Friday -- I do not see a call in her chart.     Is this a new problem: no     Date of last appointment:  9/16/2021     Can we respond via [employer]: no    Best call back number: [phone]

## 2021-11-11 DIAGNOSIS — E87.5 HYPERKALEMIA: Primary | ICD-10-CM

## 2021-11-12 ENCOUNTER — DOCUMENTATION ONLY (OUTPATIENT)
Dept: INTERNAL MEDICINE CLINIC | Age: 66
End: 2021-11-12

## 2021-11-12 NOTE — PROGRESS NOTES
Faxed lab slip to [facility] @ [phone]. Confirmation received. No significant past surgical history

## 2021-11-15 ENCOUNTER — HOSPITAL ENCOUNTER (OUTPATIENT)
Dept: MAMMOGRAPHY | Age: 66
Discharge: HOME | End: 2021-11-15
Attending: INTERNAL MEDICINE
Payer: MEDICARE

## 2021-11-15 DIAGNOSIS — Z12.31 VISIT FOR SCREENING MAMMOGRAM: ICD-10-CM

## 2021-11-15 PROCEDURE — 77063 BREAST TOMOSYNTHESIS BI: CPT

## 2021-11-16 LAB — POTASSIUM SERPL-SCNC: 4.9 MMOL/L (ref 3.5–5.2)

## 2021-11-21 ENCOUNTER — OFFICE VISIT (OUTPATIENT)
Dept: URGENT CARE | Age: 66
End: 2021-11-21
Payer: MEDICARE

## 2021-11-21 VITALS — OXYGEN SATURATION: 95 % | RESPIRATION RATE: 16 BRPM | HEART RATE: 73 BPM | TEMPERATURE: 98.3 F

## 2021-11-21 DIAGNOSIS — Z20.822 SUSPECTED COVID-19 VIRUS INFECTION: Primary | ICD-10-CM

## 2021-11-21 LAB — SARS-COV-2 POC: NEGATIVE

## 2021-11-21 PROCEDURE — G9899 SCRN MAM PERF RSLTS DOC: HCPCS | Performed by: FAMILY MEDICINE

## 2021-11-21 PROCEDURE — 3017F COLORECTAL CA SCREEN DOC REV: CPT | Performed by: FAMILY MEDICINE

## 2021-11-21 PROCEDURE — G8427 DOCREV CUR MEDS BY ELIG CLIN: HCPCS | Performed by: FAMILY MEDICINE

## 2021-11-21 PROCEDURE — 87426 SARSCOV CORONAVIRUS AG IA: CPT | Performed by: FAMILY MEDICINE

## 2021-11-21 PROCEDURE — 1090F PRES/ABSN URINE INCON ASSESS: CPT | Performed by: FAMILY MEDICINE

## 2021-11-21 PROCEDURE — 1101F PT FALLS ASSESS-DOCD LE1/YR: CPT | Performed by: FAMILY MEDICINE

## 2021-11-21 PROCEDURE — G8432 DEP SCR NOT DOC, RNG: HCPCS | Performed by: FAMILY MEDICINE

## 2021-11-21 PROCEDURE — G8399 PT W/DXA RESULTS DOCUMENT: HCPCS | Performed by: FAMILY MEDICINE

## 2021-11-21 PROCEDURE — G8419 CALC BMI OUT NRM PARAM NOF/U: HCPCS | Performed by: FAMILY MEDICINE

## 2021-11-21 PROCEDURE — G8536 NO DOC ELDER MAL SCRN: HCPCS | Performed by: FAMILY MEDICINE

## 2021-11-21 PROCEDURE — 99202 OFFICE O/P NEW SF 15 MIN: CPT | Performed by: FAMILY MEDICINE

## 2021-12-22 ENCOUNTER — OFFICE VISIT (OUTPATIENT)
Dept: URGENT CARE | Age: 66
End: 2021-12-22
Payer: MEDICARE

## 2021-12-22 VITALS — TEMPERATURE: 98.2 F | OXYGEN SATURATION: 97 % | RESPIRATION RATE: 14 BRPM | HEART RATE: 74 BPM

## 2021-12-22 DIAGNOSIS — Z20.822 EXPOSURE TO COVID-19 VIRUS: Primary | ICD-10-CM

## 2021-12-22 LAB — SARS-COV-2 POC: NEGATIVE

## 2021-12-22 PROCEDURE — G8536 NO DOC ELDER MAL SCRN: HCPCS | Performed by: FAMILY MEDICINE

## 2021-12-22 PROCEDURE — G8432 DEP SCR NOT DOC, RNG: HCPCS | Performed by: FAMILY MEDICINE

## 2021-12-22 PROCEDURE — 3017F COLORECTAL CA SCREEN DOC REV: CPT | Performed by: FAMILY MEDICINE

## 2021-12-22 PROCEDURE — G8399 PT W/DXA RESULTS DOCUMENT: HCPCS | Performed by: FAMILY MEDICINE

## 2021-12-22 PROCEDURE — G8427 DOCREV CUR MEDS BY ELIG CLIN: HCPCS | Performed by: FAMILY MEDICINE

## 2021-12-22 PROCEDURE — 1101F PT FALLS ASSESS-DOCD LE1/YR: CPT | Performed by: FAMILY MEDICINE

## 2021-12-22 PROCEDURE — 1090F PRES/ABSN URINE INCON ASSESS: CPT | Performed by: FAMILY MEDICINE

## 2021-12-22 PROCEDURE — G9899 SCRN MAM PERF RSLTS DOC: HCPCS | Performed by: FAMILY MEDICINE

## 2021-12-22 PROCEDURE — 99212 OFFICE O/P EST SF 10 MIN: CPT | Performed by: FAMILY MEDICINE

## 2021-12-22 PROCEDURE — G8419 CALC BMI OUT NRM PARAM NOF/U: HCPCS | Performed by: FAMILY MEDICINE

## 2021-12-22 PROCEDURE — 87426 SARSCOV CORONAVIRUS AG IA: CPT | Performed by: FAMILY MEDICINE

## 2021-12-22 NOTE — PROGRESS NOTES
This patient was seen at [address] [place] [address] Urgent Care while in their vehicle due to COVID-19 pandemic with PPE and focused examination in order to decrease community viral transmission. The patient/guardian gave verbal consent to treat. Vaccinated     The history is provided by the patient. Asymptomatic    Had exposure , when went for  last week    Past Medical History:   Diagnosis Date    Allergic rhinitis     Anxiety 2016    Diverticulosis     GERD (gastroesophageal reflux disease)     Glaucoma     Macular degeneration     Osteopenia     S/P colonoscopy     diverticuli        Past Surgical History:   Procedure Laterality Date    ENDOSCOPY, COLON, DIAGNOSTIC  2008    HX BREAST BIOPSY Right     neg    HX CYST INCISION AND DRAINAGE Left     neg    HX ORTHOPAEDIC      ganglion cyst    HX REFRACTIVE SURGERY      with Dr. Althea Fink , subsequently she had a right amcular crack and has been treated by Dr. Jay Zaragoza    HX [address] [facility] [address]    breast biopsy    IN BREAST SURGERY PROCEDURE UNLISTED      breast cyst aspiration         Family History   Problem Relation Age of Onset    Other Mother         Peptic ulcer disease    Osteoporosis Mother     Hypertension Mother         pulmonary hypertension    Lung Disease Mother 80        pulmonary hypertension    Cancer Maternal Aunt         breast cancer    Breast Problems Maternal Aunt     Diabetes Maternal Grandmother         Social History     Socioeconomic History    Marital status:      Spouse name: Not on file    Number of children: Not on file    Years of education: Not on file    Highest education level: Not on file   Occupational History    Not on file   Tobacco Use    Smoking status: Never Smoker    Smokeless tobacco: Never Used   Vaping Use    Vaping Use: Never used   Substance and Sexual Activity    Alcohol use:  Yes     Alcohol/week: 0.0 standard drinks     Comment: 3 drinks per year    Drug use: No    Sexual activity: Yes     Partners: Male     Birth control/protection: None   Other Topics Concern    Not on file   Social History Narrative    Not on file     Social Determinants of Health     Financial Resource Strain:     Difficulty of Paying Living Expenses: Not on file   Food Insecurity:     Worried About Running Out of Food in the Last Year: Not on file    Annalisa of Food in the Last Year: Not on file   Transportation Needs:     Lack of Transportation (Medical): Not on file    Lack of Transportation (Non-Medical): Not on file   Physical Activity:     Days of Exercise per Week: Not on file    Minutes of Exercise per Session: Not on file   Stress:     Feeling of Stress : Not on file   Social Connections:     Frequency of Communication with Friends and Family: Not on file    Frequency of Social Gatherings with Friends and Family: Not on file    Attends Spiritism Services: Not on file    Active Member of [address] [place] [address] or Organizations: Not on file    Attends Club or Organization Meetings: Not on file    Marital Status: Not on file   Intimate Partner Violence:     Fear of Current or Ex-Partner: Not on file    Emotionally Abused: Not on file    Physically Abused: Not on file    Sexually Abused: Not on file   Housing Stability:     Unable to Pay for Housing in the Last Year: Not on file    Number of [place] in the Last Year: Not on file    Unstable Housing in the Last Year: Not on file                ALLERGIES: Patient has no known allergies. Review of Systems   All other systems reviewed and are negative. Vitals:    12/22/21 1531   Pulse: 74   Resp: 14   Temp: 98.2 °F (36.8 °C)   SpO2: 97%       Physical Exam  Vitals and nursing note reviewed. Constitutional:       General: She is not in acute distress. Appearance: She is not ill-appearing.    Pulmonary:      Effort: Pulmonary effort is normal. No respiratory distress. Breath sounds: No wheezing. MDM    Procedures        ICD-10-CM ICD-9-CM    1. Exposure to COVID-19 virus  Z20.822 V01.79 AMB POC SARS-COV-2     No orders of the defined types were placed in this encounter. Results for orders placed or performed in visit on 12/22/21   AMB POC SARS-COV-2   Result Value Ref Range    SARS-COV-2 POC Negative Negative     The patients condition was discussed with the patient and they understand. The patient is to follow up with primary care doctor. If signs and symptoms become worse the pt is to go to the ER. The patient is to take medications as prescribed.

## 2022-05-06 ENCOUNTER — TRANSCRIBE ORDER (OUTPATIENT)
Dept: SCHEDULING | Age: 67
End: 2022-05-06

## 2022-05-06 DIAGNOSIS — Z12.31 VISIT FOR SCREENING MAMMOGRAM: Primary | ICD-10-CM

## 2022-09-13 ENCOUNTER — OFFICE VISIT (OUTPATIENT)
Dept: INTERNAL MEDICINE CLINIC | Age: 67
End: 2022-09-13
Payer: MEDICARE

## 2022-09-13 VITALS
OXYGEN SATURATION: 99 % | WEIGHT: 144.6 LBS | TEMPERATURE: 97.8 F | SYSTOLIC BLOOD PRESSURE: 105 MMHG | DIASTOLIC BLOOD PRESSURE: 73 MMHG | HEART RATE: 82 BPM | HEIGHT: 61 IN | RESPIRATION RATE: 16 BRPM | BODY MASS INDEX: 27.3 KG/M2

## 2022-09-13 DIAGNOSIS — Z78.0 POSTMENOPAUSAL: ICD-10-CM

## 2022-09-13 DIAGNOSIS — M85.80 OSTEOPENIA, UNSPECIFIED LOCATION: ICD-10-CM

## 2022-09-13 DIAGNOSIS — Z00.00 WELCOME TO MEDICARE PREVENTIVE VISIT: Primary | ICD-10-CM

## 2022-09-13 DIAGNOSIS — E78.00 MILD HYPERCHOLESTEROLEMIA: ICD-10-CM

## 2022-09-13 DIAGNOSIS — N39.41 URGENCY INCONTINENCE: ICD-10-CM

## 2022-09-13 DIAGNOSIS — R73.09 ELEVATED GLUCOSE: ICD-10-CM

## 2022-09-13 DIAGNOSIS — E66.3 OVERWEIGHT (BMI 25.0-29.9): ICD-10-CM

## 2022-09-13 LAB
BILIRUB UR QL STRIP: NEGATIVE
GLUCOSE UR-MCNC: NEGATIVE MG/DL
KETONES P FAST UR STRIP-MCNC: NEGATIVE MG/DL
PH UR STRIP: 5.5 [PH] (ref 4.6–8)
PROT UR QL STRIP: NEGATIVE
SP GR UR STRIP: 1.03 (ref 1–1.03)
UA UROBILINOGEN AMB POC: NORMAL (ref 0.2–1)
URINALYSIS CLARITY POC: CLEAR
URINALYSIS COLOR POC: YELLOW
URINE BLOOD POC: NEGATIVE
URINE LEUKOCYTES POC: NEGATIVE
URINE NITRITES POC: NEGATIVE

## 2022-09-13 PROCEDURE — 1090F PRES/ABSN URINE INCON ASSESS: CPT | Performed by: INTERNAL MEDICINE

## 2022-09-13 PROCEDURE — G8536 NO DOC ELDER MAL SCRN: HCPCS | Performed by: INTERNAL MEDICINE

## 2022-09-13 PROCEDURE — G0402 INITIAL PREVENTIVE EXAM: HCPCS | Performed by: INTERNAL MEDICINE

## 2022-09-13 PROCEDURE — 81002 URINALYSIS NONAUTO W/O SCOPE: CPT | Performed by: INTERNAL MEDICINE

## 2022-09-13 PROCEDURE — G8417 CALC BMI ABV UP PARAM F/U: HCPCS | Performed by: INTERNAL MEDICINE

## 2022-09-13 PROCEDURE — G8427 DOCREV CUR MEDS BY ELIG CLIN: HCPCS | Performed by: INTERNAL MEDICINE

## 2022-09-13 PROCEDURE — G8510 SCR DEP NEG, NO PLAN REQD: HCPCS | Performed by: INTERNAL MEDICINE

## 2022-09-13 RX ORDER — TRETINOIN 0.25 MG/G
CREAM TOPICAL DAILY
COMMUNITY

## 2022-09-13 RX ORDER — FLUTICASONE PROPIONATE 50 MCG
2 SPRAY, SUSPENSION (ML) NASAL
COMMUNITY

## 2022-09-13 RX ORDER — HYDROQUINONE 40 MG/G
CREAM TOPICAL
COMMUNITY

## 2022-09-13 RX ORDER — FAMOTIDINE 20 MG/1
20 TABLET, FILM COATED ORAL DAILY
COMMUNITY
Start: 2022-06-01

## 2022-09-13 RX ORDER — METFORMIN HYDROCHLORIDE 500 MG/1
TABLET, EXTENDED RELEASE ORAL
Qty: 270 TABLET | Refills: 1 | Status: SHIPPED | OUTPATIENT
Start: 2022-09-13

## 2022-09-13 RX ORDER — LATANOPROST 50 UG/ML
1 SOLUTION/ DROPS OPHTHALMIC
COMMUNITY

## 2022-09-13 RX ORDER — DORZOLAMIDE HCL 20 MG/ML
1 SOLUTION/ DROPS OPHTHALMIC 2 TIMES DAILY
COMMUNITY

## 2022-09-13 RX ORDER — PSEUDOEPH/DM/GUAIFEN/ACETAMIN 30-10-324
EXPECTORANT ORAL
COMMUNITY

## 2022-09-13 RX ORDER — DORZOLAMIDE HYDROCHLORIDE AND TIMOLOL MALEATE 20; 5 MG/ML; MG/ML
SOLUTION/ DROPS OPHTHALMIC
COMMUNITY

## 2022-09-13 RX ORDER — METRONIDAZOLE 7.5 MG/G
GEL TOPICAL DAILY
COMMUNITY

## 2022-09-13 NOTE — ASSESSMENT & PLAN NOTE
At her request I will plan to take over management of her metformin that was started for weight loss program from [facility]. Reviewed she could also try tapering off this medication and continuing with her low sugar low-carb diet to see if she continues to require we metformin.

## 2022-09-13 NOTE — PATIENT INSTRUCTIONS
Medicare Wellness Visit, Female     The best way to live healthy is to have a lifestyle where you eat a well-balanced diet, exercise regularly, limit alcohol use, and quit all forms of tobacco/nicotine, if applicable. Regular preventive services are another way to keep healthy. Preventive services (vaccines, screening tests, monitoring & exams) can help personalize your care plan, which helps you manage your own care. Screening tests can find health problems at the earliest stages, when they are easiest to treat. [facility] follows the current, evidence-based guidelines published by the [facility]mers ([facility]) when recommending preventive services for our patients. Because we follow these guidelines, sometimes recommendations change over time as research supports it. (For example, mammograms used to be recommended annually. Even though Medicare will still pay for an annual mammogram, the newer guidelines recommend a mammogram every two years for women of average risk). Of course, you and your doctor may decide to screen more often for some diseases, based on your risk and your co-morbidities (chronic disease you are already diagnosed with). Preventive services for you include:  - Medicare offers their members a free annual wellness visit, which is time for you and your primary care provider to discuss and plan for your preventive service needs. Take advantage of this benefit every year!  -All adults over the age of 72 should receive the recommended pneumonia vaccines. Current USPSTF guidelines recommend a series of two vaccines for the best pneumonia protection.   -All adults should have a flu vaccine yearly and a tetanus vaccine every 10 years.   -All adults age 48 and older should receive the shingles vaccines (series of two vaccines).       -All adults age 38-68 who are overweight should have a diabetes screening test once every three years.   -All adults born between 80 and 1965 should be screened once for Hepatitis C.  -Other screening tests and preventive services for persons with diabetes include: an eye exam to screen for diabetic retinopathy, a kidney function test, a foot exam, and stricter control over your cholesterol.   -Cardiovascular screening for adults with routine risk involves an electrocardiogram (ECG) at intervals determined by your doctor.   -Colorectal cancer screenings should be done for adults age 54-65 with no increased risk factors for colorectal cancer. There are a number of acceptable methods of screening for this type of cancer. Each test has its own benefits and drawbacks. Discuss with your doctor what is most appropriate for you during your annual wellness visit. The different tests include: colonoscopy (considered the best screening method), a fecal occult blood test, a fecal DNA test, and sigmoidoscopy.    -A bone mass density test is recommended when a woman turns 65 to screen for osteoporosis. This test is only recommended one time, as a screening. Some providers will use this same test as a disease monitoring tool if you already have osteoporosis. -Breast cancer screenings are recommended every other year for women of normal risk, age 54-69.  -Cervical cancer screenings for women over age 72 are only recommended with certain risk factors. Here is a list of your current Health Maintenance items (your personalized list of preventive services) with a due date:  Health Maintenance Due   Topic Date Due    Annual Well Visit  09/17/2022         Medicare Wellness Visit, Female     The best way to live healthy is to have a lifestyle where you eat a well-balanced diet, exercise regularly, limit alcohol use, and quit all forms of tobacco/nicotine, if applicable. Regular preventive services are another way to keep healthy.  Preventive services (vaccines, screening tests, monitoring & exams) can help personalize your care plan, which helps you manage your own care. Screening tests can find health problems at the earliest stages, when they are easiest to treat. Justine follows the current, evidence-based guidelines published by the [facility] Summers ([facility]) when recommending preventive services for our patients. Because we follow these guidelines, sometimes recommendations change over time as research supports it. (For example, mammograms used to be recommended annually. Even though Medicare will still pay for an annual mammogram, the newer guidelines recommend a mammogram every two years for women of average risk). Of course, you and your doctor may decide to screen more often for some diseases, based on your risk and your co-morbidities (chronic disease you are already diagnosed with). Preventive services for you include:  - Medicare offers their members a free annual wellness visit, which is time for you and your primary care provider to discuss and plan for your preventive service needs. Take advantage of this benefit every year!  -All adults over the age of 72 should receive the recommended pneumonia vaccines. Current USPSTF guidelines recommend a series of two vaccines for the best pneumonia protection.   -All adults should have a flu vaccine yearly and a tetanus vaccine every 10 years.   -All adults age 48 and older should receive the shingles vaccines (series of two vaccines).       -All adults age 38-68 who are overweight should have a diabetes screening test once every three years.   -All adults born between 80 and 1965 should be screened once for Hepatitis C.  -Other screening tests and preventive services for persons with diabetes include: an eye exam to screen for diabetic retinopathy, a kidney function test, a foot exam, and stricter control over your cholesterol.   -Cardiovascular screening for adults with routine risk involves an electrocardiogram (ECG) at intervals determined by your doctor.   -Colorectal cancer screenings should be done for adults age 54-65 with no increased risk factors for colorectal cancer. There are a number of acceptable methods of screening for this type of cancer. Each test has its own benefits and drawbacks. Discuss with your doctor what is most appropriate for you during your annual wellness visit. The different tests include: colonoscopy (considered the best screening method), a fecal occult blood test, a fecal DNA test, and sigmoidoscopy.    -A bone mass density test is recommended when a woman turns 65 to screen for osteoporosis. This test is only recommended one time, as a screening. Some providers will use this same test as a disease monitoring tool if you already have osteoporosis. -Breast cancer screenings are recommended every other year for women of normal risk, age 54-69.  -Cervical cancer screenings for women over age 72 are only recommended with certain risk factors. Here is a list of your current Health Maintenance items (your personalized list of preventive services) with a due date: There are no preventive care reminders to display for this patient.

## 2022-09-13 NOTE — LETTER
9/13/2022 10:52 AM    Ms. Oracio Garcia [id number]        Dear Dr. Britney Hernandez    Please fax us the most recent office note/EKG report so that we may update the patient's records for continuity of care.      Our fax number: [phone]    Patient:   Joseph Douglas  1955                    Sincerely,      Luis M Galvez MD

## 2022-09-13 NOTE — PROGRESS NOTES
This is a \"Welcome to [employer]"  Initial Preventive Physical Examination (IPPE) providing Personalized Prevention Plan Services (Performed in the first 12 months of enrollment)    I have reviewed the patient's medical history in detail and updated the computerized patient record. She saw cardiology in April and was had EKG and stress test due to family hx. Testing was nl. She is also being seen at [facility] and is on metformin and healthy low carb low sugar diet for wt loss. She requests that I take over management of metformin for weight loss. Assessment/Plan   Education and counseling provided:  Are appropriate based on today's review and evaluation    1. Welcome to Medicare preventive visit  Health maintenance reviewed and updated with patient today at visit. 2. Postmenopausal  -     DEXA BONE DENSITY STUDY AXIAL; Future  3. Mild hypercholesterolemia  -     LIPID PANEL; Future  -     METABOLIC PANEL, COMPREHENSIVE; Future  -     CBC WITH AUTOMATED DIFF; Future  4. Osteopenia, unspecified location  Assessment & Plan:  Reviewed calcium, vitamin D and exercise for bone health. 5. Elevated glucose  -     HEMOGLOBIN A1C WITH EAG; Future  6. Urgency incontinence  Urine negative for infection. Referral to urology. -     AMB POC URINALYSIS DIP STICK MANUAL W/O MICRO  -     REFERRAL TO UROLOGY  7. Overweight (BMI 25.0-29. 9)  Assessment & Plan: At her request I will plan to take over management of her metformin that was started for weight loss program from [place] [facility]. Reviewed she could also try tapering off this medication and continuing with her low sugar low-carb diet to see if she continues to require we metformin.      Depression Risk Screen     3 most recent PHQ Screens 9/10/2022   Little interest or pleasure in doing things Not at all   Feeling down, depressed, irritable, or hopeless Not at all   Total Score PHQ 2 0       Alcohol & Drug Abuse Risk Screen   Do you average more than 1 drink per night or more than 7 drinks a week?: (P) No  On any one occasion in the past three months have you had more than 3 drinks containing alcohol?: (P) No          Functional Ability and Level of Safety   Diet:  Diet: (P) The patient is prescribed and follows a special diet   Hearing:  Hearing: (P) Patient reports hearing is good    Vision:  Vision: (P) good   Activities of Daily Living: The home contains: (P) grab bars  Functional ADLs: (P) Patient does total self care   Ambulation:  Patient ambulates: (P) with no difficulty   Exercise:  Exercise: (P) moderately active   Fall Risk Screen:  Fall Risk Assessment, last 12 mths 9/13/2022   Able to walk? Yes   Fall in past 12 months? 0   Do you feel unsteady? 0   Are you worried about falling 0     Abuse Screen:  Do you ever feel afraid of your partner?: (P) No  Are you in a relationship with someone who physically or mentally threatens you?: (P) No  Is it safe for you to go home?: (P) Yes      Screening EKG   EKG order placed: No will request EKG from cardiology. End of Life Planning   Advanced care planning directives were discussed with the patient and /or family/caregiver.      Health Maintenance Due     Health Maintenance Due   Topic Date Due    Medicare Yearly Exam  09/17/2022       Patient Care Team   Patient Care Team:  Casie Watkins MD as PCP - [facility]  Ivanna Adair MD as PCP - [facility] Provider    History     Past Medical History:   Diagnosis Date    Allergic rhinitis     Anxiety 7/19/2016    Diverticulosis     GERD (gastroesophageal reflux disease)     Glaucoma     Macular degeneration     Osteopenia     S/P colonoscopy 2008    diverticuli      Past Surgical History:   Procedure Laterality Date    ENDOSCOPY, COLON, DIAGNOSTIC  January 2008    HX BREAST BIOPSY Right 1993    neg    HX CYST INCISION AND DRAINAGE Left 2001    neg    HX ORTHOPAEDIC  2002    ganglion cyst    HX REFRACTIVE SURGERY  1999    with Dr. Jefry Chun , subsequently she had a right amcular crack and has been treated by Dr. Ismael Marroquin [address]    breast biopsy    TX BREAST SURGERY PROCEDURE UNLISTED  2001    breast cyst aspiration     Current Outpatient Medications   Medication Sig Dispense Refill    famotidine (PEPCID) 20 mg tablet Take 20 mg by mouth daily. hydroquinone (ESOTERICA, MELQUIN) 4 % topical cream Apply  to affected area daily as needed. metroNIDAZOLE (METROGEL) 0.75 % topical gel Apply  to affected area daily. tretinoin (RETIN-A) 0.025 % topical cream Apply  to affected area daily. Lactobacillus acidophilus (PROBIOTIC PO) Take 1 Tablet by mouth daily. dorzolamide-timoloL (COSOPT) 22.3-6.8 mg/mL ophthalmic solution       dorzolamide (TRUSOPT) 2 % ophthalmic solution Administer 1 Drop to right eye two (2) times a day. vitamin E-C-T-lutein-minerals (OCUVITE) tablet Take 1 Tablet by mouth daily. fluticasone propionate (FLONASE) 50 mcg/actuation nasal spray 2 Sprays by Both Nostrils route daily as needed. lutein 20 mg cap 1 capsule with a meal      latanoprost (XALATAN) 0.005 % ophthalmic solution Administer 1 Drop to right eye nightly. fluticasone propionate (FLONASE) 50 mcg/actuation nasal spray USE 2 SPRAYS IN EACH NOSTRIL DAILY 48 g 2    metFORMIN ER (GLUCOPHAGE XR) 500 mg tablet Take 500 mg by mouth three (3) times daily. VIT C/VIT E/LUTEIN/MIN/OMEGA-3 (OCUVITE PO) Take  by mouth two (2) times a day. CALCIUM CITRATE/VITAMIN D3 (CITRACAL + D PO) Take  by mouth.      latanoprost (XALATAN) 0.005 % ophthalmic solution Administer 1 Drop to right eye nightly. dorzolamide-timoloL (COSOPT) 22.3-6.8 mg/mL ophthalmic solution Administer 1 Drop to right eye two (2) times a day. GLUCOSAM & CHONDROIT-MV & MIN3 (GLUCOSAMINE &CHONDROIT-MV-MIN3 PO) Take  by mouth. ascorbic acid, vitamin C, (VITAMIN C) 1,000 mg tablet Take  by mouth. esomeprazole (NEXIUM) 40 mg capsule TAKE 1 CAPSULE DAILY FOR GASTROESOPHAGEAL REFLUX DISEASE 90 Capsule 3    multivitamins-minerals-lutein (CENTRUM SILVER) Tab Take  by mouth. No Known Allergies    Family History   Problem Relation Age of Onset    Other Mother         Peptic ulcer disease    Osteoporosis Mother     Hypertension Mother         pulmonary hypertension    Lung Disease Mother 80        Pulmonary hypertension    Cancer Maternal Aunt         Breast cancer    Breast Problems Maternal Aunt     Diabetes Maternal Grandmother      Social History     Tobacco Use    Smoking status: Never    Smokeless tobacco: Never   Substance Use Topics    Alcohol use: Yes     Comment: 1-2 drinks per month     Review of Systems   Constitutional:  Negative for fever, malaise/fatigue and weight loss. HENT:  Negative for congestion and sore throat. Eyes: Negative. Respiratory:  Negative for cough and shortness of breath. Cardiovascular:  Negative for chest pain and leg swelling. Gastrointestinal:  Positive for heartburn (contolled on pepcid daily. she is now off of nexium). Negative for abdominal pain, blood in stool, constipation, diarrhea and nausea. Genitourinary:  Negative for dysuria, frequency and urgency. Musculoskeletal:  Negative for back pain and joint pain. Skin:  Negative for rash. Neurological:  Negative for dizziness, sensory change, focal weakness and headaches. Psychiatric/Behavioral:  Negative for depression. Visit Vitals  /73 (BP 1 Location: Right arm, BP Patient Position: Sitting, BP Cuff Size: Large adult)   Pulse 82   Temp 97.8 °F (36.6 °C) (Temporal)   Resp 16   Ht 5' 1.26\" (1.556 m)   Wt 144 lb 9.6 oz (65.6 kg)   LMP 08/01/2004   SpO2 99%   BMI 27.09 kg/m²     GENERAL:  Pleasant female in no apparent distress. HEENT:  Normocephalic, atraumatic. Sinuses nontender. Posterior Pharynx clear, no erythema. NECK:  Supple, full range of motion.   No thyromegaly or adenopathy. No carotid bruits auscultated. BACK:  Nontender. RESPIRATORY:  Lungs clear to auscultation bilaterally without wheezes or crackles. CARDIAC:  Regular rate and rhythm, no murmurs, rubs or gallops. BREASTS:  No dimples, retraction, color changes, nipple discharge, or masses present. No supra- or infraclavicular or axillary nodes palpable. ABDOMEN:  Soft, nontender. Positive bowel sounds. No masses. No hepatosplenomegaly. EXTREMITIES: Peripheral pulses are symmetric and palpable. No cyanosis, clubbing, or edema. NEUROLOGIC: Mini-Mental status exam 29/30. Nonfocal.  SKIN: Normal appearance.      Chencho Washburn MD

## 2022-09-15 LAB
ALBUMIN SERPL-MCNC: 4.5 G/DL (ref 3.8–4.8)
ALBUMIN/GLOB SERPL: 1.9 {RATIO} (ref 1.2–2.2)
ALP SERPL-CCNC: 183 IU/L (ref 44–121)
ALT SERPL-CCNC: 9 IU/L (ref 0–32)
AST SERPL-CCNC: 14 IU/L (ref 0–40)
BASOPHILS # BLD AUTO: 0.1 X10E3/UL (ref 0–0.2)
BASOPHILS NFR BLD AUTO: 1 %
BILIRUB SERPL-MCNC: 0.4 MG/DL (ref 0–1.2)
BUN SERPL-MCNC: 19 MG/DL (ref 8–27)
BUN/CREAT SERPL: 27 (ref 12–28)
CALCIUM SERPL-MCNC: 9.8 MG/DL (ref 8.7–10.3)
CHLORIDE SERPL-SCNC: 104 MMOL/L (ref 96–106)
CHOLEST SERPL-MCNC: 188 MG/DL (ref 100–199)
CO2 SERPL-SCNC: 26 MMOL/L (ref 20–29)
CREAT SERPL-MCNC: 0.7 MG/DL (ref 0.57–1)
EGFR: 96 ML/MIN/1.73
EOSINOPHIL # BLD AUTO: 0.3 X10E3/UL (ref 0–0.4)
EOSINOPHIL NFR BLD AUTO: 6 %
ERYTHROCYTE [DISTWIDTH] IN BLOOD BY AUTOMATED COUNT: 13.1 % (ref 11.7–15.4)
EST. AVERAGE GLUCOSE BLD GHB EST-MCNC: 111 MG/DL
GLOBULIN SER CALC-MCNC: 2.4 G/DL (ref 1.5–4.5)
GLUCOSE SERPL-MCNC: 109 MG/DL (ref 65–99)
HBA1C MFR BLD: 5.5 % (ref 4.8–5.6)
HCT VFR BLD AUTO: 45.5 % (ref 34–46.6)
HDLC SERPL-MCNC: 80 MG/DL
HGB BLD-MCNC: 14.8 G/DL (ref 11.1–15.9)
IMM GRANULOCYTES # BLD AUTO: 0 X10E3/UL (ref 0–0.1)
IMM GRANULOCYTES NFR BLD AUTO: 0 %
LDLC SERPL CALC-MCNC: 96 MG/DL (ref 0–99)
LYMPHOCYTES # BLD AUTO: 1.9 X10E3/UL (ref 0.7–3.1)
LYMPHOCYTES NFR BLD AUTO: 32 %
MCH RBC QN AUTO: 29.1 PG (ref 26.6–33)
MCHC RBC AUTO-ENTMCNC: 32.5 G/DL (ref 31.5–35.7)
MCV RBC AUTO: 90 FL (ref 79–97)
MONOCYTES # BLD AUTO: 0.5 X10E3/UL (ref 0.1–0.9)
MONOCYTES NFR BLD AUTO: 8 %
NEUTROPHILS # BLD AUTO: 3.2 X10E3/UL (ref 1.4–7)
NEUTROPHILS NFR BLD AUTO: 53 %
PLATELET # BLD AUTO: 247 X10E3/UL (ref 150–450)
POTASSIUM SERPL-SCNC: 5.3 MMOL/L (ref 3.5–5.2)
PROT SERPL-MCNC: 6.9 G/DL (ref 6–8.5)
RBC # BLD AUTO: 5.08 X10E6/UL (ref 3.77–5.28)
SODIUM SERPL-SCNC: 143 MMOL/L (ref 134–144)
TRIGL SERPL-MCNC: 63 MG/DL (ref 0–149)
VLDLC SERPL CALC-MCNC: 12 MG/DL (ref 5–40)
WBC # BLD AUTO: 5.9 X10E3/UL (ref 3.4–10.8)

## 2022-09-27 NOTE — PROGRESS NOTES
Notify pt chol at goal and blood sugar slightly elevated but hemoglobin A1c which represents 3-month average blood sugar is in normal range. .   Potassium is slightly elevated. Increase water intake and recheck potassium in 2 weeks. Also alkaline phosphatase level elevated and higher than last check and recommend abdominal US to evaluate for cause of this elevation. Other liver test are normal.  Will recheck alk phos with lab work also in 2 weeks. Labs ordered.

## 2022-10-10 DIAGNOSIS — R74.8 ELEVATED ALKALINE PHOSPHATASE LEVEL: ICD-10-CM

## 2022-10-10 DIAGNOSIS — E87.5 HYPERKALEMIA: Primary | ICD-10-CM

## 2022-10-10 NOTE — PROGRESS NOTES
Notified patient per MD result note. Patient will complete labs and in agreement with US.  Understanding verbalized

## 2022-10-15 DIAGNOSIS — R74.8 ELEVATED ALKALINE PHOSPHATASE LEVEL: Primary | ICD-10-CM

## 2022-10-21 DIAGNOSIS — R74.8 ELEVATED ALKALINE PHOSPHATASE LEVEL: ICD-10-CM

## 2022-10-21 DIAGNOSIS — E87.5 HYPERKALEMIA: Primary | ICD-10-CM

## 2022-11-17 ENCOUNTER — HOSPITAL ENCOUNTER (OUTPATIENT)
Dept: MAMMOGRAPHY | Age: 67
Discharge: HOME | End: 2022-11-17
Attending: INTERNAL MEDICINE
Payer: MEDICARE

## 2022-11-17 DIAGNOSIS — Z78.0 POSTMENOPAUSAL: ICD-10-CM

## 2022-11-17 DIAGNOSIS — Z12.31 VISIT FOR SCREENING MAMMOGRAM: ICD-10-CM

## 2022-11-17 PROCEDURE — 77063 BREAST TOMOSYNTHESIS BI: CPT

## 2022-11-17 PROCEDURE — 77080 DXA BONE DENSITY AXIAL: CPT

## 2022-11-18 ENCOUNTER — PATIENT MESSAGE (OUTPATIENT)
Dept: INTERNAL MEDICINE CLINIC | Age: 67
End: 2022-11-18

## 2023-03-06 RX ORDER — FLUTICASONE PROPIONATE 50 MCG
SPRAY, SUSPENSION (ML) NASAL
Qty: 48 G | Refills: 2 | Status: SHIPPED | OUTPATIENT
Start: 2023-03-06

## 2023-03-14 ENCOUNTER — OFFICE VISIT (OUTPATIENT)
Dept: INTERNAL MEDICINE CLINIC | Age: 68
End: 2023-03-14
Payer: MEDICARE

## 2023-03-14 VITALS
SYSTOLIC BLOOD PRESSURE: 124 MMHG | HEIGHT: 61 IN | BODY MASS INDEX: 26.62 KG/M2 | RESPIRATION RATE: 18 BRPM | DIASTOLIC BLOOD PRESSURE: 76 MMHG | OXYGEN SATURATION: 97 % | HEART RATE: 97 BPM | WEIGHT: 141 LBS | TEMPERATURE: 97.8 F

## 2023-03-14 DIAGNOSIS — E78.00 MILD HYPERCHOLESTEROLEMIA: Primary | ICD-10-CM

## 2023-03-14 DIAGNOSIS — R73.09 ELEVATED GLUCOSE: ICD-10-CM

## 2023-03-14 DIAGNOSIS — R74.8 ELEVATED ALKALINE PHOSPHATASE LEVEL: ICD-10-CM

## 2023-03-14 PROCEDURE — G8427 DOCREV CUR MEDS BY ELIG CLIN: HCPCS | Performed by: NURSE PRACTITIONER

## 2023-03-14 PROCEDURE — 1101F PT FALLS ASSESS-DOCD LE1/YR: CPT | Performed by: NURSE PRACTITIONER

## 2023-03-14 PROCEDURE — 3017F COLORECTAL CA SCREEN DOC REV: CPT | Performed by: NURSE PRACTITIONER

## 2023-03-14 PROCEDURE — 99213 OFFICE O/P EST LOW 20 MIN: CPT | Performed by: NURSE PRACTITIONER

## 2023-03-14 PROCEDURE — G8432 DEP SCR NOT DOC, RNG: HCPCS | Performed by: NURSE PRACTITIONER

## 2023-03-14 PROCEDURE — G9899 SCRN MAM PERF RSLTS DOC: HCPCS | Performed by: NURSE PRACTITIONER

## 2023-03-14 PROCEDURE — 1090F PRES/ABSN URINE INCON ASSESS: CPT | Performed by: NURSE PRACTITIONER

## 2023-03-14 PROCEDURE — G8399 PT W/DXA RESULTS DOCUMENT: HCPCS | Performed by: NURSE PRACTITIONER

## 2023-03-14 PROCEDURE — G8417 CALC BMI ABV UP PARAM F/U: HCPCS | Performed by: NURSE PRACTITIONER

## 2023-03-14 PROCEDURE — G8536 NO DOC ELDER MAL SCRN: HCPCS | Performed by: NURSE PRACTITIONER

## 2023-03-14 RX ORDER — METFORMIN HYDROCHLORIDE 500 MG/1
TABLET, EXTENDED RELEASE ORAL
Qty: 270 TABLET | Refills: 3 | Status: SHIPPED | OUTPATIENT
Start: 2023-03-14

## 2023-03-14 RX ORDER — VIBEGRON 75 MG/1
75 TABLET, FILM COATED ORAL DAILY
COMMUNITY

## 2023-03-14 NOTE — PROGRESS NOTES
Chief Complaint   Patient presents with    Follow-up     Blood pressure 124/76, pulse 97, temperature 97.8 °F (36.6 °C), temperature source Temporal, resp. rate 18, height 5' 1\" (1.549 m), weight 141 lb (64 kg), last menstrual period 08/01/2004, SpO2 97 %.

## 2023-03-14 NOTE — PROGRESS NOTES
HISTORY OF PRESENT ILLNESS  Chetan Rai is a 78 y.o. female. Patient presents for 6 month follow-up. Patient has appointment scheduled with GI in May due to elevated alk phos. No concerns today. She is using pepcid and tums as needed to control GERD symptoms. Visit Vitals  /76   Pulse 97   Temp 97.8 °F (36.6 °C) (Temporal)   Resp 18   Ht 5' 1\" (1.549 m)   Wt 141 lb (64 kg)   LMP 08/01/2004   SpO2 97%   BMI 26.64 kg/m²       HPI    Review of Systems   Respiratory:  Negative for shortness of breath. Cardiovascular:  Negative for chest pain and palpitations. Physical Exam  Constitutional:       Appearance: Normal appearance. HENT:      Head: Normocephalic. Cardiovascular:      Rate and Rhythm: Normal rate and regular rhythm. Pulmonary:      Effort: Pulmonary effort is normal.      Breath sounds: Normal breath sounds. Skin:     General: Skin is warm and dry. Neurological:      General: No focal deficit present. Mental Status: She is alert and oriented to person, place, and time. Psychiatric:         Mood and Affect: Mood normal.         Behavior: Behavior normal.       ASSESSMENT and PLAN    ICD-10-CM ICD-9-CM    1. Mild hypercholesterolemia  E78.00 272.0 CBC WITH AUTOMATED DIFF      LIPID PANEL      2. Elevated glucose  R73.09 790.29 CBC WITH AUTOMATED DIFF      METABOLIC PANEL, COMPREHENSIVE      HEMOGLOBIN A1C WITH EAG      3.  Elevated alkaline phosphatase level  R74.8 790.5 CBC WITH AUTOMATED DIFF      ALK PHOS ISOENZYMES      Labs ordered  Follow-up in 6 months

## 2023-05-24 ENCOUNTER — HOSPITAL ENCOUNTER (OUTPATIENT)
Facility: HOSPITAL | Age: 68
Discharge: HOME | End: 2023-05-27
Payer: MEDICARE

## 2023-05-24 DIAGNOSIS — R74.8 ELEVATED ALKALINE PHOSPHATASE LEVEL: ICD-10-CM

## 2023-05-24 PROCEDURE — 76700 US EXAM ABDOM COMPLETE: CPT

## 2023-09-10 SDOH — HEALTH STABILITY: PHYSICAL HEALTH: ON AVERAGE, HOW MANY DAYS PER WEEK DO YOU ENGAGE IN MODERATE TO STRENUOUS EXERCISE (LIKE A BRISK WALK)?: 3 DAYS

## 2023-09-10 SDOH — HEALTH STABILITY: PHYSICAL HEALTH: ON AVERAGE, HOW MANY MINUTES DO YOU ENGAGE IN EXERCISE AT THIS LEVEL?: 30 MIN

## 2023-09-10 ASSESSMENT — PATIENT HEALTH QUESTIONNAIRE - PHQ9
SUM OF ALL RESPONSES TO PHQ9 QUESTIONS 1 & 2: 0
SUM OF ALL RESPONSES TO PHQ QUESTIONS 1-9: 0
2. FEELING DOWN, DEPRESSED OR HOPELESS: 0
SUM OF ALL RESPONSES TO PHQ QUESTIONS 1-9: 0
1. LITTLE INTEREST OR PLEASURE IN DOING THINGS: 0

## 2023-09-10 ASSESSMENT — LIFESTYLE VARIABLES
HOW OFTEN DO YOU HAVE A DRINK CONTAINING ALCOHOL: 2
HOW OFTEN DO YOU HAVE SIX OR MORE DRINKS ON ONE OCCASION: 1
HOW MANY STANDARD DRINKS CONTAINING ALCOHOL DO YOU HAVE ON A TYPICAL DAY: 1 OR 2
HOW OFTEN DO YOU HAVE A DRINK CONTAINING ALCOHOL: MONTHLY OR LESS
HOW MANY STANDARD DRINKS CONTAINING ALCOHOL DO YOU HAVE ON A TYPICAL DAY: 1

## 2023-09-13 ENCOUNTER — OFFICE VISIT (OUTPATIENT)
Age: 68
End: 2023-09-13
Payer: MEDICARE

## 2023-09-13 VITALS
RESPIRATION RATE: 16 BRPM | SYSTOLIC BLOOD PRESSURE: 120 MMHG | OXYGEN SATURATION: 96 % | WEIGHT: 141.4 LBS | BODY MASS INDEX: 26.7 KG/M2 | DIASTOLIC BLOOD PRESSURE: 76 MMHG | HEART RATE: 82 BPM | HEIGHT: 61 IN | TEMPERATURE: 97.8 F

## 2023-09-13 DIAGNOSIS — K21.9 GASTROESOPHAGEAL REFLUX DISEASE WITHOUT ESOPHAGITIS: ICD-10-CM

## 2023-09-13 DIAGNOSIS — N39.41 URGE INCONTINENCE: ICD-10-CM

## 2023-09-13 DIAGNOSIS — M85.80 OSTEOPENIA, UNSPECIFIED LOCATION: ICD-10-CM

## 2023-09-13 DIAGNOSIS — R73.03 PREDIABETES: ICD-10-CM

## 2023-09-13 DIAGNOSIS — Z00.00 MEDICARE ANNUAL WELLNESS VISIT, SUBSEQUENT: Primary | ICD-10-CM

## 2023-09-13 DIAGNOSIS — Z12.31 VISIT FOR SCREENING MAMMOGRAM: ICD-10-CM

## 2023-09-13 PROBLEM — E66.3 OVERWEIGHT (BMI 25.0-29.9): Status: RESOLVED | Noted: 2022-09-13 | Resolved: 2023-09-13

## 2023-09-13 PROCEDURE — 99213 OFFICE O/P EST LOW 20 MIN: CPT | Performed by: INTERNAL MEDICINE

## 2023-09-13 PROCEDURE — 1123F ACP DISCUSS/DSCN MKR DOCD: CPT | Performed by: INTERNAL MEDICINE

## 2023-09-13 PROCEDURE — G0439 PPPS, SUBSEQ VISIT: HCPCS | Performed by: INTERNAL MEDICINE

## 2023-09-13 RX ORDER — IBUPROFEN 600 MG/1
TABLET ORAL
COMMUNITY
Start: 2023-06-16

## 2023-09-13 RX ORDER — ESTRADIOL 10 UG/1
INSERT VAGINAL
COMMUNITY

## 2023-09-13 RX ORDER — METFORMIN HYDROCHLORIDE 500 MG/1
TABLET, EXTENDED RELEASE ORAL
Qty: 270 TABLET | Refills: 1 | Status: SHIPPED | OUTPATIENT
Start: 2023-09-13

## 2023-09-13 SDOH — ECONOMIC STABILITY: HOUSING INSECURITY
IN THE LAST 12 MONTHS, WAS THERE A TIME WHEN YOU DID NOT HAVE A STEADY PLACE TO SLEEP OR SLEPT IN A SHELTER (INCLUDING NOW)?: NO

## 2023-09-13 SDOH — ECONOMIC STABILITY: FOOD INSECURITY: WITHIN THE PAST 12 MONTHS, YOU WORRIED THAT YOUR FOOD WOULD RUN OUT BEFORE YOU GOT MONEY TO BUY MORE.: NEVER TRUE

## 2023-09-13 SDOH — ECONOMIC STABILITY: INCOME INSECURITY: HOW HARD IS IT FOR YOU TO PAY FOR THE VERY BASICS LIKE FOOD, HOUSING, MEDICAL CARE, AND HEATING?: NOT HARD AT ALL

## 2023-09-13 SDOH — ECONOMIC STABILITY: FOOD INSECURITY: WITHIN THE PAST 12 MONTHS, THE FOOD YOU BOUGHT JUST DIDN'T LAST AND YOU DIDN'T HAVE MONEY TO GET MORE.: NEVER TRUE

## 2023-09-13 ASSESSMENT — PATIENT HEALTH QUESTIONNAIRE - PHQ9
SUM OF ALL RESPONSES TO PHQ QUESTIONS 1-9: 0
SUM OF ALL RESPONSES TO PHQ9 QUESTIONS 1 & 2: 0
SUM OF ALL RESPONSES TO PHQ QUESTIONS 1-9: 0
1. LITTLE INTEREST OR PLEASURE IN DOING THINGS: 0
SUM OF ALL RESPONSES TO PHQ QUESTIONS 1-9: 0
2. FEELING DOWN, DEPRESSED OR HOPELESS: 0
SUM OF ALL RESPONSES TO PHQ QUESTIONS 1-9: 0

## 2023-09-13 ASSESSMENT — ENCOUNTER SYMPTOMS
ABDOMINAL PAIN: 0
DIARRHEA: 0
NAUSEA: 0
BLOOD IN STOOL: 0
SORE THROAT: 0
BACK PAIN: 0
SHORTNESS OF BREATH: 0
CONSTIPATION: 0
COUGH: 0

## 2023-09-13 NOTE — ACP (ADVANCE CARE PLANNING)
As discussed in your appointment today, Formerly named [facility] [address] is an important part of planning for your healthcare future. Discussing your preferences with your family and your health care team is important to make sure any medical decisions made are guided by your known values and goals. We have Advance Care Planning (ACP) specialists at [facility] who can meet with you to discuss your wishes and goals for [address] [place] [address].  At this time patient does not wish to be contacted by our ACP specialist.

## 2023-09-13 NOTE — PATIENT INSTRUCTIONS
Advance Directives: Care Instructions  Overview  An advance directive is a legal way to state your wishes at the end of your life. It tells your family and your doctor what to do if you can't say what you want. There are two main types of advance directives. You can change them any time your wishes change. Living will. This form tells your family and your doctor your wishes about life support and other treatment. The form is also called a declaration. Medical power of . This form lets you name a person to make treatment decisions for you when you can't speak for yourself. This person is called a health care agent (health care proxy, health care surrogate). The form is also called a durable power of  for health care. If you do not have an advance directive, decisions about your medical care may be made by a family member, or by a doctor or a  who doesn't know you. It may help to think of an advance directive as a gift to the people who care for you. If you have one, they won't have to make tough decisions by themselves. For more information, including forms for your state, see the [address] [place] [address] website (www.caringinfo.org/planning/advance-directives/). Follow-up care is a key part of your treatment and safety. Be sure to make and go to all appointments, and call your doctor if you are having problems. It's also a good idea to know your test results and keep a list of the medicines you take. What should you include in an advance directive? Many states have a unique advance directive form. (It may ask you to address specific issues.) Or you might use a universal form that's approved by many states. If your form doesn't tell you what to address, it may be hard to know what to include in your advance directive. Use the questions below to help you get started. Who do you want to make decisions about your medical care if you are not able to?   What life-support measures do you want if you

## 2023-09-13 NOTE — PROGRESS NOTES
metroNIDAZOLE (METROGEL) 0.75 % gel Apply topically daily Yes Ar Automatic Reconciliation   tretinoin (RETIN-A) 0.025 % cream Apply topically as needed Yes Ar Automatic Reconciliation       CareTeam (Including outside providers/suppliers regularly involved in providing care):   Patient Care Team:  Kerry Busby MD as PCP - General  Kerry Busby MD as PCP - Empaneled Provider     Reviewed and updated this visit:  Tobacco  Allergies  Meds  Problems  Med Hx  Surg Hx  Soc Hx  Fam Hx

## 2023-09-14 DIAGNOSIS — R73.03 PREDIABETES: ICD-10-CM

## 2023-09-15 LAB
BUN SERPL-MCNC: 15 MG/DL (ref 8–27)
BUN/CREAT SERPL: 19 (ref 12–28)
CALCIUM SERPL-MCNC: 9.5 MG/DL (ref 8.7–10.3)
CHLORIDE SERPL-SCNC: 104 MMOL/L (ref 96–106)
CO2 SERPL-SCNC: 26 MMOL/L (ref 20–29)
CREAT SERPL-MCNC: 0.81 MG/DL (ref 0.57–1)
EGFRCR SERPLBLD CKD-EPI 2021: 80 ML/MIN/1.73
GLUCOSE SERPL-MCNC: 94 MG/DL (ref 70–99)
HBA1C MFR BLD: 5.3 % (ref 4.8–5.6)
POTASSIUM SERPL-SCNC: 5.4 MMOL/L (ref 3.5–5.2)
SODIUM SERPL-SCNC: 141 MMOL/L (ref 134–144)

## 2023-11-20 ENCOUNTER — HOSPITAL ENCOUNTER (OUTPATIENT)
Facility: HOSPITAL | Age: 68
Discharge: HOME | End: 2023-11-23
Attending: INTERNAL MEDICINE
Payer: MEDICARE

## 2023-11-20 DIAGNOSIS — Z12.31 VISIT FOR SCREENING MAMMOGRAM: ICD-10-CM

## 2023-11-20 PROCEDURE — 77063 BREAST TOMOSYNTHESIS BI: CPT

## 2024-03-14 ENCOUNTER — OFFICE VISIT (OUTPATIENT)
Age: 69
End: 2024-03-14
Payer: MEDICARE

## 2024-03-14 VITALS
TEMPERATURE: 97.3 F | DIASTOLIC BLOOD PRESSURE: 71 MMHG | SYSTOLIC BLOOD PRESSURE: 117 MMHG | BODY MASS INDEX: 26.96 KG/M2 | OXYGEN SATURATION: 98 % | WEIGHT: 142.8 LBS | HEART RATE: 72 BPM | RESPIRATION RATE: 16 BRPM | HEIGHT: 61 IN

## 2024-03-14 DIAGNOSIS — M85.80 OSTEOPENIA, UNSPECIFIED LOCATION: ICD-10-CM

## 2024-03-14 DIAGNOSIS — K21.9 GASTROESOPHAGEAL REFLUX DISEASE WITHOUT ESOPHAGITIS: ICD-10-CM

## 2024-03-14 DIAGNOSIS — R73.03 PREDIABETES: Primary | ICD-10-CM

## 2024-03-14 PROCEDURE — 99214 OFFICE O/P EST MOD 30 MIN: CPT | Performed by: INTERNAL MEDICINE

## 2024-03-14 PROCEDURE — 1123F ACP DISCUSS/DSCN MKR DOCD: CPT | Performed by: INTERNAL MEDICINE

## 2024-03-14 ASSESSMENT — PATIENT HEALTH QUESTIONNAIRE - PHQ9
1. LITTLE INTEREST OR PLEASURE IN DOING THINGS: 0
SUM OF ALL RESPONSES TO PHQ QUESTIONS 1-9: 0
2. FEELING DOWN, DEPRESSED OR HOPELESS: 0
SUM OF ALL RESPONSES TO PHQ9 QUESTIONS 1 & 2: 0
SUM OF ALL RESPONSES TO PHQ QUESTIONS 1-9: 0

## 2024-03-14 ASSESSMENT — ENCOUNTER SYMPTOMS
SHORTNESS OF BREATH: 0
ABDOMINAL PAIN: 0

## 2024-03-14 NOTE — ASSESSMENT & PLAN NOTE
Has breakthrough GERD if she does not take her Pepcid twice a day regularly.  Counseled regarding setting alarm for Pepcid dosing.  Also reviewed could consider restarting a PPI.  She is concerned about risk for further bone loss on PPI and wants to hold on this for now.  Reviewed GERD precautions.

## 2024-03-14 NOTE — PROGRESS NOTES
Scarlett Kc is a 68 y.o. female who was seen in clinic today (3/14/2024).      Assessment & Plan:   Below is the assessment and plan developed based on review of pertinent history, physical exam, labs, studies, and medications.  1. Prediabetes  Assessment & Plan:   Uncertain control and due for lab work to reassess.  Continue with current metformin pending lab work continue diet low in sugar and carbohydrates.  Orders:  -     Comprehensive Metabolic Panel; Future  -     Hemoglobin A1C; Future  -     CBC with Auto Differential; Future  2. Osteopenia, unspecified location  Assessment & Plan:   Calcium, vitamin D, weightbearing exercise.  Reviewed last bone density.  3. Gastroesophageal reflux disease without esophagitis  Assessment & Plan:  Has breakthrough GERD if she does not take her Pepcid twice a day regularly.  Counseled regarding setting alarm for Pepcid dosing.  Also reviewed could consider restarting a PPI.  She is concerned about risk for further bone loss on PPI and wants to hold on this for now.  Reviewed GERD precautions.     Return in about 6 months (around 9/14/2024) for MWV.   Subjective/Objective:   Scarlett was seen today for Follow-up   follow up active medical problems and medication management.   Patient Active Problem List   Diagnosis    Macular degeneration    Osteopenia    GERD (gastroesophageal reflux disease)    Urge incontinence    Prediabetes     Taking medication with no side effects.   Exercise: walking dog Diet low sugar low carb diet.    She reports breakthrough symptoms of GERD if she does not take her Pepcid twice a day exactly on time.    Current Outpatient Medications   Medication Sig Dispense Refill    Estradiol (VAGIFEM) 10 MCG TABS vaginal tablet Vagifem 10 mcg vaginal tablet   1 tab nightly twice a week      ibuprofen (ADVIL;MOTRIN) 600 MG tablet TAKE 1 TABLET BY MOUTH EVERY 6 HOURS AS NEEDED FOR MODERATE POST OP PAIN      metFORMIN (GLUCOPHAGE-XR) 500 MG extended release

## 2024-03-14 NOTE — ASSESSMENT & PLAN NOTE
Uncertain control and due for lab work to reassess.  Continue with current metformin pending lab work continue diet low in sugar and carbohydrates.

## 2024-03-14 NOTE — PATIENT INSTRUCTIONS
A Healthy Lifestyle: Care Instructions  A healthy lifestyle can help you feel good, have more energy, and stay at a weight that's healthy for you. You can share a healthy lifestyle with your friends and family. And you can do it on your own.    Eat meals with your friends or family. You could try cooking together.   Plan activities with other people. Go for a walk with a friend, try a free online fitness class, or join a sports league.     Eat a variety of healthy foods. These include fruits, vegetables, whole grains, low-fat dairy, and lean protein.   Choose healthy portions of food. You can use the Nutrition Facts label on food packages as a guide.     Eat more fruits and vegetables. You could add vegetables to sandwiches or add fruit to cereal.   Drink water when you are thirsty. Limit soda, juice, and sports drinks.     Try to exercise most days. Aim for at least 2½ hours of exercise each week.   Keep moving. Work in the garden or take your dog on a walk. Use the stairs instead of the elevator.     If you use tobacco or nicotine, try to quit. Ask your doctor about programs and medicines to help you quit.   Limit alcohol. Men should have no more than 2 drinks a day. Women should have no more than 1. For some people, no alcohol is the best choice.   Follow-up care is a key part of your treatment and safety. Be sure to make and go to all appointments, and call your doctor if you are having problems. It's also a good idea to know your test results and keep a list of the medicines you take.  Where can you learn more?  Go to https://www.[employer].net/patientEd and enter U807 to learn more about \"A Healthy Lifestyle: Care Instructions.\"  Current as of: November 14, 2022               Content Version: 13.7  © 5050-7037 Healthwise, Incorporated.   Care instructions adapted under license by [employer]. If you have questions about a medical condition or this instruction, always ask your healthcare professional.

## 2024-03-15 DIAGNOSIS — R73.03 PREDIABETES: ICD-10-CM

## 2024-03-15 LAB
ALBUMIN SERPL-MCNC: 3.9 G/DL (ref 3.5–5)
ALBUMIN/GLOB SERPL: 1.5 (ref 1.1–2.2)
ALP SERPL-CCNC: 116 U/L (ref 45–117)
ALT SERPL-CCNC: 18 U/L (ref 12–78)
ANION GAP SERPL CALC-SCNC: 7 MMOL/L (ref 5–15)
AST SERPL-CCNC: 15 U/L (ref 15–37)
BASOPHILS # BLD: 0.1 K/UL (ref 0–0.1)
BASOPHILS NFR BLD: 1 % (ref 0–1)
BILIRUB SERPL-MCNC: 0.7 MG/DL (ref 0.2–1)
BUN SERPL-MCNC: 20 MG/DL (ref 6–20)
BUN/CREAT SERPL: 27 (ref 12–20)
CALCIUM SERPL-MCNC: 8.9 MG/DL (ref 8.5–10.1)
CHLORIDE SERPL-SCNC: 108 MMOL/L (ref 97–108)
CO2 SERPL-SCNC: 29 MMOL/L (ref 21–32)
CREAT SERPL-MCNC: 0.75 MG/DL (ref 0.55–1.02)
DIFFERENTIAL METHOD BLD: NORMAL
EOSINOPHIL # BLD: 0.4 K/UL (ref 0–0.4)
EOSINOPHIL NFR BLD: 7 % (ref 0–7)
ERYTHROCYTE [DISTWIDTH] IN BLOOD BY AUTOMATED COUNT: 12.6 % (ref 11.5–14.5)
EST. AVERAGE GLUCOSE BLD GHB EST-MCNC: 100 MG/DL
GLOBULIN SER CALC-MCNC: 2.6 G/DL (ref 2–4)
GLUCOSE SERPL-MCNC: 98 MG/DL (ref 65–100)
HBA1C MFR BLD: 5.1 % (ref 4–5.6)
HCT VFR BLD AUTO: 41.6 % (ref 35–47)
HGB BLD-MCNC: 13.9 G/DL (ref 11.5–16)
IMM GRANULOCYTES # BLD AUTO: 0 K/UL (ref 0–0.04)
IMM GRANULOCYTES NFR BLD AUTO: 0 % (ref 0–0.5)
LYMPHOCYTES # BLD: 1.3 K/UL (ref 0.8–3.5)
LYMPHOCYTES NFR BLD: 26 % (ref 12–49)
MCH RBC QN AUTO: 29.4 PG (ref 26–34)
MCHC RBC AUTO-ENTMCNC: 33.4 G/DL (ref 30–36.5)
MCV RBC AUTO: 88.1 FL (ref 80–99)
MONOCYTES # BLD: 0.4 K/UL (ref 0–1)
MONOCYTES NFR BLD: 8 % (ref 5–13)
NEUTS SEG # BLD: 3 K/UL (ref 1.8–8)
NEUTS SEG NFR BLD: 58 % (ref 32–75)
NRBC # BLD: 0 K/UL (ref 0–0.01)
NRBC BLD-RTO: 0 PER 100 WBC
PLATELET # BLD AUTO: 228 K/UL (ref 150–400)
PMV BLD AUTO: 10.2 FL (ref 8.9–12.9)
POTASSIUM SERPL-SCNC: 4.7 MMOL/L (ref 3.5–5.1)
PROT SERPL-MCNC: 6.5 G/DL (ref 6.4–8.2)
RBC # BLD AUTO: 4.72 M/UL (ref 3.8–5.2)
SODIUM SERPL-SCNC: 144 MMOL/L (ref 136–145)
WBC # BLD AUTO: 5.1 K/UL (ref 3.6–11)

## 2024-04-03 RX ORDER — METFORMIN HYDROCHLORIDE 500 MG/1
TABLET, EXTENDED RELEASE ORAL
Qty: 270 TABLET | Refills: 1 | Status: SHIPPED | OUTPATIENT
Start: 2024-04-03

## 2024-05-09 ENCOUNTER — OFFICE VISIT (OUTPATIENT)
Age: 69
End: 2024-05-09
Payer: MEDICARE

## 2024-05-09 VITALS
SYSTOLIC BLOOD PRESSURE: 129 MMHG | TEMPERATURE: 97.9 F | BODY MASS INDEX: 27 KG/M2 | HEART RATE: 76 BPM | DIASTOLIC BLOOD PRESSURE: 84 MMHG | OXYGEN SATURATION: 98 % | RESPIRATION RATE: 16 BRPM | WEIGHT: 143 LBS | HEIGHT: 61 IN

## 2024-05-09 DIAGNOSIS — H69.91 DYSFUNCTION OF RIGHT EUSTACHIAN TUBE: Primary | ICD-10-CM

## 2024-05-09 PROCEDURE — G8427 DOCREV CUR MEDS BY ELIG CLIN: HCPCS | Performed by: STUDENT IN AN ORGANIZED HEALTH CARE EDUCATION/TRAINING PROGRAM

## 2024-05-09 PROCEDURE — G8399 PT W/DXA RESULTS DOCUMENT: HCPCS | Performed by: STUDENT IN AN ORGANIZED HEALTH CARE EDUCATION/TRAINING PROGRAM

## 2024-05-09 PROCEDURE — 99213 OFFICE O/P EST LOW 20 MIN: CPT | Performed by: STUDENT IN AN ORGANIZED HEALTH CARE EDUCATION/TRAINING PROGRAM

## 2024-05-09 PROCEDURE — 1036F TOBACCO NON-USER: CPT | Performed by: STUDENT IN AN ORGANIZED HEALTH CARE EDUCATION/TRAINING PROGRAM

## 2024-05-09 PROCEDURE — 1090F PRES/ABSN URINE INCON ASSESS: CPT | Performed by: STUDENT IN AN ORGANIZED HEALTH CARE EDUCATION/TRAINING PROGRAM

## 2024-05-09 PROCEDURE — 3017F COLORECTAL CA SCREEN DOC REV: CPT | Performed by: STUDENT IN AN ORGANIZED HEALTH CARE EDUCATION/TRAINING PROGRAM

## 2024-05-09 PROCEDURE — 1123F ACP DISCUSS/DSCN MKR DOCD: CPT | Performed by: STUDENT IN AN ORGANIZED HEALTH CARE EDUCATION/TRAINING PROGRAM

## 2024-05-09 PROCEDURE — G8419 CALC BMI OUT NRM PARAM NOF/U: HCPCS | Performed by: STUDENT IN AN ORGANIZED HEALTH CARE EDUCATION/TRAINING PROGRAM

## 2024-05-09 NOTE — PROGRESS NOTES
0   Housing Stability: Unknown (9/13/2023)    Housing Stability Vital Sign     Unable to Pay for Housing in the Last Year: Not on file     Number of Places Lived in the Last Year: Not on file     Unstable Housing in the Last Year: No   Interpersonal Safety: Not on file   Utilities: Not on file       
decongestant.   - Recommend to add Allegra daily and increase Flonase to twice daily for a short period.   - Can take decongestant periodically as needed, but recommend to avoid daily use.     Return if symptoms worsen or fail to improve.     Steven Tinsley, DO

## 2024-05-16 ENCOUNTER — TELEPHONE (OUTPATIENT)
Age: 69
End: 2024-05-16

## 2024-05-16 NOTE — TELEPHONE ENCOUNTER
Informed patient that Dr. Mendez is not accepting new patients at the moment. Patient scheduled new patient appointment with Dr. Castillo on 11/21/24.

## 2024-05-16 NOTE — TELEPHONE ENCOUNTER
----- Message from Madie Delcid sent at 5/16/2024  3:36 PM EDT -----  Subject: Appointment Request    Reason for Call: New Patient/New to Provider Appointment needed: New   Patient Request Appointment    QUESTIONS    Reason for appointment request? No appointments available during search     Additional Information for Provider? patient is looking to switch   providers due to location. Patient stated provider Vanessa was listed on   her insurance and said she was accepting new patients. No appointments   showing. Please call  ---------------------------------------------------------------------------  --------------  CALL BACK INFO  [phone]; OK to leave message on voicemail,OK to respond with electronic   message via [employer] portal (only for patients who have registered [employer]   account)  ---------------------------------------------------------------------------  --------------  SCRIPT ANSWERS

## 2024-08-08 ENCOUNTER — TELEPHONE (OUTPATIENT)
Age: 69
End: 2024-08-08

## 2024-08-08 NOTE — TELEPHONE ENCOUNTER
Patient was inquiring if Dr. Castillo had a cancellation for a sooner new patient appointment. Informed patient that Dr. Csatillo has not had a cancellation and that the office will reach out if Dr. Castillo has a cancellation.

## 2024-08-08 NOTE — TELEPHONE ENCOUNTER
----- Message from Sobeida Acuna sent at 8/8/2024 10:46 AM EDT -----  Regarding: [facility] Appointment Request  [facility] Appointment Request    Patient needs appointment for [facility] Appointment Type: New Patient.    Patient Requested Dates(s): 09/19/24  Patient Requested Time: Anytime will do  Provider Name: Tyler Louis    Reason for Appointment Request: New Patient - Available appointments did not meet patient need  --------------------------------------------------------------------------------------------------------------------------    Relationship to Patient: Self     Call Back Information: OK to leave message on voicemail  Preferred Call Back Number: Phone [phone]

## 2024-08-26 RX ORDER — METFORMIN HCL 500 MG
TABLET, EXTENDED RELEASE 24 HR ORAL
Qty: 270 TABLET | Refills: 1 | OUTPATIENT
Start: 2024-08-26

## 2024-09-04 RX ORDER — FLUTICASONE PROPIONATE 50 MCG
SPRAY, SUSPENSION (ML) NASAL
Qty: 48 G | Refills: 3 | Status: SHIPPED | OUTPATIENT
Start: 2024-09-04

## 2024-09-04 NOTE — TELEPHONE ENCOUNTER
Chief Complaint   Patient presents with    Medication Refill     Last Appointment with Dr. Steven Tinsley 5/9/24    Future Appointments   Date Time Provider Department Center   11/21/2024 10:00 AM Lashanda Louis MD [facility] DEP   3/20/2025 11:00 AM Vidya Calzada MD [facility] DEP   [facility]

## 2024-10-09 DIAGNOSIS — R73.03 PREDIABETES: Primary | ICD-10-CM

## 2024-10-09 NOTE — TELEPHONE ENCOUNTER
Pt will be transitioning to a new PCP closer to her home.  She has 6 days of metformin and would like enough to get through the month of Nov.

## 2024-10-10 RX ORDER — METFORMIN HCL 500 MG
TABLET, EXTENDED RELEASE 24 HR ORAL
Qty: 270 TABLET | Refills: 0 | Status: SHIPPED | OUTPATIENT
Start: 2024-10-10

## 2024-11-18 SDOH — HEALTH STABILITY: PHYSICAL HEALTH: ON AVERAGE, HOW MANY DAYS PER WEEK DO YOU ENGAGE IN MODERATE TO STRENUOUS EXERCISE (LIKE A BRISK WALK)?: 0 DAYS

## 2024-11-21 ENCOUNTER — OFFICE VISIT (OUTPATIENT)
Age: 69
End: 2024-11-21
Payer: MEDICARE

## 2024-11-21 VITALS
WEIGHT: 145.8 LBS | BODY MASS INDEX: 24.89 KG/M2 | SYSTOLIC BLOOD PRESSURE: 129 MMHG | DIASTOLIC BLOOD PRESSURE: 74 MMHG | TEMPERATURE: 97.4 F | RESPIRATION RATE: 18 BRPM | HEART RATE: 77 BPM | HEIGHT: 64 IN | OXYGEN SATURATION: 96 %

## 2024-11-21 DIAGNOSIS — K21.9 GASTROESOPHAGEAL REFLUX DISEASE WITHOUT ESOPHAGITIS: ICD-10-CM

## 2024-11-21 DIAGNOSIS — H40.1112 PRIMARY OPEN ANGLE GLAUCOMA (POAG) OF RIGHT EYE, MODERATE STAGE: ICD-10-CM

## 2024-11-21 DIAGNOSIS — R73.03 PRE-DIABETES: ICD-10-CM

## 2024-11-21 DIAGNOSIS — Z90.710 HX OF HYSTERECTOMY: Primary | ICD-10-CM

## 2024-11-21 DIAGNOSIS — M72.2 PLANTAR FASCIITIS, RIGHT: ICD-10-CM

## 2024-11-21 DIAGNOSIS — Z86.39 HX OF OBESITY: ICD-10-CM

## 2024-11-21 DIAGNOSIS — M85.80 OSTEOPENIA, UNSPECIFIED LOCATION: ICD-10-CM

## 2024-11-21 PROCEDURE — 99204 OFFICE O/P NEW MOD 45 MIN: CPT | Performed by: INTERNAL MEDICINE

## 2024-11-21 RX ORDER — METHYLPREDNISOLONE 4 MG/1
TABLET ORAL
COMMUNITY
Start: 2024-11-19

## 2024-11-21 RX ORDER — MELOXICAM 7.5 MG/1
TABLET ORAL
COMMUNITY
Start: 2024-11-19

## 2024-11-21 SDOH — ECONOMIC STABILITY: INCOME INSECURITY: HOW HARD IS IT FOR YOU TO PAY FOR THE VERY BASICS LIKE FOOD, HOUSING, MEDICAL CARE, AND HEATING?: NOT HARD AT ALL

## 2024-11-21 SDOH — ECONOMIC STABILITY: FOOD INSECURITY: WITHIN THE PAST 12 MONTHS, THE FOOD YOU BOUGHT JUST DIDN'T LAST AND YOU DIDN'T HAVE MONEY TO GET MORE.: NEVER TRUE

## 2024-11-21 SDOH — ECONOMIC STABILITY: FOOD INSECURITY: WITHIN THE PAST 12 MONTHS, YOU WORRIED THAT YOUR FOOD WOULD RUN OUT BEFORE YOU GOT MONEY TO BUY MORE.: NEVER TRUE

## 2024-11-21 NOTE — PROGRESS NOTES
Scarlett Kc (:  1955) is a 69 y.o. female, Established care,  here for evaluation of the following chief complaint(s):  Establish Care and Knee Pain (Right knee pain - medrol dose pack started this morning. )         Assessment & Plan  1. Gallstones.  An abdominal ultrasound revealed the presence of gallstones. She was advised to seek immediate medical attention at the emergency room if she experiences severe right upper quadrant pain, nausea, or vomiting.    2. Elevated liver enzymes.  Her liver enzymes were previously elevated but have since improved. No further action is required at this time.    3. Glaucoma.  She has glaucoma in the right eye and is using dorzolamide-timolol. She was instructed to use Xalatan in both eyes as a precaution.    4. Acid reflux.  She was advised to take Nexium 30 minutes before meals in the morning and continue with Pepcid at night. If she takes Nexium, she should not take Pepcid in the morning. She was also advised to take Nexium while on Medrol Dosepak.    5. Plantar fasciitis.  She was recently prescribed a Medrol Dosepak for plantar fasciitis and instructed to follow up with meloxicam after completing the dosepak. She was advised that these medications might exacerbate her acid reflux.    6. Overactive bladder.  She is currently taking Myrbetriq for overactive bladder and should continue this medication as prescribed.    7. Vaginal dryness.  She uses estradiol vaginal tablets twice a week for vaginal dryness. She was advised that this medication is as needed and can help prevent UTIs and maintain vaginal pH.    8. Osteopenia.  She has osteopenia and was advised to engage in resistance training to help prevent osteoporosis.    9. Prediabetes.  She is currently on metformin for weight management and prediabetes. She was advised that she can continue or discontinue metformin, with regular monitoring of kidney and liver function if she remains on it. Labs, including

## 2024-11-21 NOTE — PATIENT INSTRUCTIONS
exercise with ease and no pain, add some resistance. To do this:  Tie the ends of an exercise band together to form a loop. To hold the band in place, shut a door on the band so the knot is on the other side of the door, or attach one end of the loop to a secure object. (Or you can have someone hold one end of the loop to provide resistance.)  Loop the other end of the exercise band around the lower part of your affected leg.  Repeat steps 1 through 5, slowly pulling back on the exercise band with your leg.  Shallow standing knee bend    Stand with your hands lightly resting on a counter or chair in front of you. Put your feet shoulder-width apart.  Slowly bend your knees so that you squat down like you're going to sit in a chair. Make sure that your knees don't go in front of your toes.  Lower yourself about 6 inches. Your heels should stay on the floor at all times.  Rise slowly to a standing position.  Repeat 8 to 12 times.  Heel raise    Stand with your feet a few inches apart, with your hands lightly resting on a counter or chair in front of you.  Slowly raise your heels 1 to 2 inches off the floor while keeping your knees straight. Hold for about 6 seconds, then slowly lower your heels to the floor.  Repeat 8 to 12 times.  Follow-up care is a key part of your treatment and safety. Be sure to make and go to all appointments, and call your doctor if you are having problems. It's also a good idea to know your test results and keep a list of the medicines you take.  Current as of: July 17, 2023  Content Version: 14.2  © 2024 [employer].   Care instructions adapted under license by [employer]. If you have questions about a medical condition or this instruction, always ask your healthcare professional. [employer] disclaims any warranty or liability for your use of this information.

## 2024-11-22 ENCOUNTER — HOSPITAL ENCOUNTER (OUTPATIENT)
Facility: HOSPITAL | Age: 69
Discharge: HOME | End: 2024-11-22
Payer: MEDICARE

## 2024-11-22 VITALS — HEIGHT: 62 IN | BODY MASS INDEX: 25.76 KG/M2 | WEIGHT: 140 LBS

## 2024-11-22 DIAGNOSIS — Z12.31 VISIT FOR SCREENING MAMMOGRAM: ICD-10-CM

## 2024-11-22 LAB
ALBUMIN SERPL-MCNC: 3.9 G/DL (ref 3.5–5)
ALBUMIN/GLOB SERPL: 1.4 (ref 1.1–2.2)
ALP SERPL-CCNC: 169 U/L (ref 45–117)
ALT SERPL-CCNC: 19 U/L (ref 12–78)
ANION GAP SERPL CALC-SCNC: 6 MMOL/L (ref 2–12)
AST SERPL-CCNC: 11 U/L (ref 15–37)
BASOPHILS # BLD: 0.1 K/UL (ref 0–0.1)
BASOPHILS NFR BLD: 1 % (ref 0–1)
BILIRUB SERPL-MCNC: 0.3 MG/DL (ref 0.2–1)
BUN SERPL-MCNC: 21 MG/DL (ref 6–20)
BUN/CREAT SERPL: 30 (ref 12–20)
CALCIUM SERPL-MCNC: 9.8 MG/DL (ref 8.5–10.1)
CHLORIDE SERPL-SCNC: 105 MMOL/L (ref 97–108)
CO2 SERPL-SCNC: 29 MMOL/L (ref 21–32)
CREAT SERPL-MCNC: 0.69 MG/DL (ref 0.55–1.02)
DIFFERENTIAL METHOD BLD: ABNORMAL
EOSINOPHIL # BLD: 0.1 K/UL (ref 0–0.4)
EOSINOPHIL NFR BLD: 1 % (ref 0–7)
ERYTHROCYTE [DISTWIDTH] IN BLOOD BY AUTOMATED COUNT: 12.8 % (ref 11.5–14.5)
EST. AVERAGE GLUCOSE BLD GHB EST-MCNC: 94 MG/DL
GLOBULIN SER CALC-MCNC: 2.8 G/DL (ref 2–4)
GLUCOSE SERPL-MCNC: 116 MG/DL (ref 65–100)
HBA1C MFR BLD: 4.9 % (ref 4–5.6)
HCT VFR BLD AUTO: 40.9 % (ref 35–47)
HGB BLD-MCNC: 13.9 G/DL (ref 11.5–16)
IMM GRANULOCYTES # BLD AUTO: 0 K/UL (ref 0–0.04)
IMM GRANULOCYTES NFR BLD AUTO: 0 % (ref 0–0.5)
LYMPHOCYTES # BLD: 0.8 K/UL (ref 0.8–3.5)
LYMPHOCYTES NFR BLD: 10 % (ref 12–49)
MCH RBC QN AUTO: 30 PG (ref 26–34)
MCHC RBC AUTO-ENTMCNC: 34 G/DL (ref 30–36.5)
MCV RBC AUTO: 88.1 FL (ref 80–99)
MONOCYTES # BLD: 0.3 K/UL (ref 0–1)
MONOCYTES NFR BLD: 3 % (ref 5–13)
NEUTS SEG # BLD: 7.1 K/UL (ref 1.8–8)
NEUTS SEG NFR BLD: 85 % (ref 32–75)
NRBC # BLD: 0 K/UL (ref 0–0.01)
NRBC BLD-RTO: 0 PER 100 WBC
PLATELET # BLD AUTO: 239 K/UL (ref 150–400)
PMV BLD AUTO: 10 FL (ref 8.9–12.9)
POTASSIUM SERPL-SCNC: 5 MMOL/L (ref 3.5–5.1)
PROT SERPL-MCNC: 6.7 G/DL (ref 6.4–8.2)
RBC # BLD AUTO: 4.64 M/UL (ref 3.8–5.2)
SODIUM SERPL-SCNC: 140 MMOL/L (ref 136–145)
WBC # BLD AUTO: 8.5 K/UL (ref 3.6–11)

## 2024-11-22 PROCEDURE — 77063 BREAST TOMOSYNTHESIS BI: CPT

## 2025-01-08 LAB — ALP SERPL-CCNC: 141 IU/L (ref 44–121)

## 2025-01-14 ENCOUNTER — PATIENT MESSAGE (OUTPATIENT)
Age: 70
End: 2025-01-14

## 2025-01-14 DIAGNOSIS — R73.03 PREDIABETES: ICD-10-CM

## 2025-01-14 RX ORDER — METFORMIN HYDROCHLORIDE 500 MG/1
TABLET, EXTENDED RELEASE ORAL
Qty: 270 TABLET | Refills: 0 | Status: SHIPPED | OUTPATIENT
Start: 2025-01-14

## 2025-01-14 NOTE — TELEPHONE ENCOUNTER
PCP: Lashanda Louis MD    Last appt:   11/21/2024    Future Appointments   Date Time Provider Department Center   6/3/2025 10:30 AM Lashanda Louis MD [facility] DEP       Requested Prescriptions     Pending Prescriptions Disp Refills    metFORMIN (GLUCOPHAGE-XR) 500 MG extended release tablet 270 tablet 0     Sig: TAKE 2 TABLETS IN THE MORNING AND 1 TABLET IN THE EVENING

## 2025-02-06 ENCOUNTER — TELEPHONE (OUTPATIENT)
Age: 70
End: 2025-02-06

## 2025-02-06 NOTE — TELEPHONE ENCOUNTER
Pt states was bitten on right pinky finger by niece's dog today, 02/06/25. Pt states used neosporin on wound but isn't sure if antibiotics would be necessary.    States the bite happened about 2 hours ago and the wound is still oozing.     Please call to advice

## 2025-02-07 NOTE — TELEPHONE ENCOUNTER
Attempted to call patient. Patient did not answer the phone.        If patient calls back she will need to be seen for an appt or go to urgent care . She will need medication .

## 2025-02-07 NOTE — TELEPHONE ENCOUNTER
Pt states bite is starting to heal and will monitor the wound and if it gets worse will call to schedule appt or go to UC.

## 2025-03-27 DIAGNOSIS — R73.03 PREDIABETES: ICD-10-CM

## 2025-03-28 RX ORDER — METFORMIN HYDROCHLORIDE 500 MG/1
TABLET, EXTENDED RELEASE ORAL
Qty: 270 TABLET | Refills: 0 | Status: SHIPPED | OUTPATIENT
Start: 2025-03-28

## 2025-06-01 SDOH — ECONOMIC STABILITY: FOOD INSECURITY: WITHIN THE PAST 12 MONTHS, THE FOOD YOU BOUGHT JUST DIDN'T LAST AND YOU DIDN'T HAVE MONEY TO GET MORE.: NEVER TRUE

## 2025-06-01 SDOH — ECONOMIC STABILITY: INCOME INSECURITY: IN THE LAST 12 MONTHS, WAS THERE A TIME WHEN YOU WERE NOT ABLE TO PAY THE MORTGAGE OR RENT ON TIME?: NO

## 2025-06-01 SDOH — ECONOMIC STABILITY: FOOD INSECURITY: WITHIN THE PAST 12 MONTHS, YOU WORRIED THAT YOUR FOOD WOULD RUN OUT BEFORE YOU GOT MONEY TO BUY MORE.: NEVER TRUE

## 2025-06-01 SDOH — ECONOMIC STABILITY: TRANSPORTATION INSECURITY
IN THE PAST 12 MONTHS, HAS LACK OF TRANSPORTATION KEPT YOU FROM MEETINGS, WORK, OR FROM GETTING THINGS NEEDED FOR DAILY LIVING?: NO

## 2025-06-01 SDOH — ECONOMIC STABILITY: TRANSPORTATION INSECURITY
IN THE PAST 12 MONTHS, HAS THE LACK OF TRANSPORTATION KEPT YOU FROM MEDICAL APPOINTMENTS OR FROM GETTING MEDICATIONS?: NO

## 2025-06-03 ENCOUNTER — OFFICE VISIT (OUTPATIENT)
Age: 70
End: 2025-06-03
Payer: MEDICARE

## 2025-06-03 VITALS
DIASTOLIC BLOOD PRESSURE: 83 MMHG | OXYGEN SATURATION: 98 % | BODY MASS INDEX: 26.54 KG/M2 | HEART RATE: 98 BPM | WEIGHT: 144.2 LBS | RESPIRATION RATE: 18 BRPM | HEIGHT: 62 IN | SYSTOLIC BLOOD PRESSURE: 130 MMHG | TEMPERATURE: 97.8 F

## 2025-06-03 DIAGNOSIS — Z86.39 HX OF OBESITY: ICD-10-CM

## 2025-06-03 DIAGNOSIS — K21.9 GASTROESOPHAGEAL REFLUX DISEASE WITHOUT ESOPHAGITIS: ICD-10-CM

## 2025-06-03 DIAGNOSIS — R74.8 HIGH ALKALINE PHOSPHATASE: ICD-10-CM

## 2025-06-03 DIAGNOSIS — Z90.710 HX OF HYSTERECTOMY: Primary | ICD-10-CM

## 2025-06-03 DIAGNOSIS — M85.80 OSTEOPENIA, UNSPECIFIED LOCATION: ICD-10-CM

## 2025-06-03 PROCEDURE — 1090F PRES/ABSN URINE INCON ASSESS: CPT | Performed by: INTERNAL MEDICINE

## 2025-06-03 PROCEDURE — 3017F COLORECTAL CA SCREEN DOC REV: CPT | Performed by: INTERNAL MEDICINE

## 2025-06-03 PROCEDURE — 1123F ACP DISCUSS/DSCN MKR DOCD: CPT | Performed by: INTERNAL MEDICINE

## 2025-06-03 PROCEDURE — 1160F RVW MEDS BY RX/DR IN RCRD: CPT | Performed by: INTERNAL MEDICINE

## 2025-06-03 PROCEDURE — 99214 OFFICE O/P EST MOD 30 MIN: CPT | Performed by: INTERNAL MEDICINE

## 2025-06-03 PROCEDURE — G8399 PT W/DXA RESULTS DOCUMENT: HCPCS | Performed by: INTERNAL MEDICINE

## 2025-06-03 PROCEDURE — 1159F MED LIST DOCD IN RCRD: CPT | Performed by: INTERNAL MEDICINE

## 2025-06-03 PROCEDURE — G8419 CALC BMI OUT NRM PARAM NOF/U: HCPCS | Performed by: INTERNAL MEDICINE

## 2025-06-03 PROCEDURE — G8427 DOCREV CUR MEDS BY ELIG CLIN: HCPCS | Performed by: INTERNAL MEDICINE

## 2025-06-03 PROCEDURE — 1036F TOBACCO NON-USER: CPT | Performed by: INTERNAL MEDICINE

## 2025-06-03 ASSESSMENT — PATIENT HEALTH QUESTIONNAIRE - PHQ9
SUM OF ALL RESPONSES TO PHQ QUESTIONS 1-9: 0
SUM OF ALL RESPONSES TO PHQ QUESTIONS 1-9: 0
2. FEELING DOWN, DEPRESSED OR HOPELESS: NOT AT ALL
SUM OF ALL RESPONSES TO PHQ QUESTIONS 1-9: 0
1. LITTLE INTEREST OR PLEASURE IN DOING THINGS: NOT AT ALL
SUM OF ALL RESPONSES TO PHQ QUESTIONS 1-9: 0

## 2025-06-03 NOTE — PROGRESS NOTES
Chief Complaint   Patient presents with    Ear Fullness     Pressure in left ear    Gastroesophageal Reflux     \"Have you been to the ER, urgent care clinic since your last visit?  Hospitalized since your last visit?\"    NO    “Have you seen or consulted any other health care providers outside of [facility] since your last visit?”    NO

## 2025-06-03 NOTE — PROGRESS NOTES
Ms. Scarlett Kc is presenting to follow up     CC:  Ear Fullness (Pressure in left ear) and Gastroesophageal Reflux       HPI:    Ms. Scarlett Kc   is a 69 y.o. female with a hx of pre diabetes, osteopenia, OAB and glaucoma and GERD presenting to follow up   Last seen by me on  11/2024    GERD: takes pepcid in the morning and at night time. Having some breakthrough at night if over eats.   Takes PPI only when symptoms more pronounced    On myrbetriq for OAB  On estradiol 10mg vaginal tablets      Hx of obesity: has been working hard on weight loss and down to 144 lbs and metformin has helped    Reports fullness in the left ear for years after and incidental cutip injury with bleeding several years ago.   Colonoscopy 2018 normal  Pap smear no longer indicated  Mammogram 11/2024 normal    Review of systems:  Constitutional: negative for fever, chills, weight loss, night sweats     10 systems reviewed and negative other then HPI     Past Medical History:   Diagnosis Date    Allergic rhinitis     Anxiety 7/19/2016    Diverticulosis     GERD (gastroesophageal reflux disease)     Glaucoma     Macular degeneration     Osteopenia     S/P colonoscopy 2008    diverticuli    Stones, bladder, diverticulum     Urinary incontinence 10/1/2022    Nocturia        Past Surgical History:   Procedure Laterality Date    ABDOMINAL SACROCOLPOPEXY N/A 06/27/2023    BASAL CELL CARCINOMA EXCISION Left 08/02/2023    Left loger leg    BREAST BIOPSY Right 1993    neg    BREAST SURGERY  2001    breast cyst aspiration    BREAST SURGERY  1995    breast biopsy    COLONOSCOPY  January 2008    CYST INCISION AND DRAINAGE Left 2001    neg    ORTHOPEDIC SURGERY  2002    ganglion cyst    REFRACTIVE SURGERY  1999    with Dr. argueta , subsequently she had a right amcular crack and has been treated by Dr. Rosmery ERIC AND BSO (CERVIX REMOVED)         Allergies   Allergen Reactions    Pollen Extract Cough, Other (See Comments) and Itching

## 2025-06-04 LAB
ALBUMIN SERPL-MCNC: 3.9 G/DL (ref 3.5–5)
ALBUMIN/GLOB SERPL: 1.5 (ref 1.1–2.2)
ALP SERPL-CCNC: 146 U/L (ref 45–117)
ALT SERPL-CCNC: 20 U/L (ref 12–78)
ANION GAP SERPL CALC-SCNC: 5 MMOL/L (ref 2–12)
AST SERPL-CCNC: 13 U/L (ref 15–37)
BILIRUB SERPL-MCNC: 0.6 MG/DL (ref 0.2–1)
BUN SERPL-MCNC: 18 MG/DL (ref 6–20)
BUN/CREAT SERPL: 21 (ref 12–20)
CALCIUM SERPL-MCNC: 9.6 MG/DL (ref 8.5–10.1)
CHLORIDE SERPL-SCNC: 106 MMOL/L (ref 97–108)
CO2 SERPL-SCNC: 29 MMOL/L (ref 21–32)
CREAT SERPL-MCNC: 0.86 MG/DL (ref 0.55–1.02)
GLOBULIN SER CALC-MCNC: 2.6 G/DL (ref 2–4)
GLUCOSE SERPL-MCNC: 95 MG/DL (ref 65–100)
POTASSIUM SERPL-SCNC: 5.2 MMOL/L (ref 3.5–5.1)
PROT SERPL-MCNC: 6.5 G/DL (ref 6.4–8.2)
SODIUM SERPL-SCNC: 140 MMOL/L (ref 136–145)

## 2025-06-09 LAB
ALP BONE CFR SERPL: 31 % (ref 14–68)
ALP INTEST CFR SERPL: 49 % (ref 0–18)
ALP LIVER CFR SERPL: 20 % (ref 18–85)
ALP SERPL-CCNC: 137 IU/L (ref 44–121)

## 2025-06-12 ENCOUNTER — RESULTS FOLLOW-UP (OUTPATIENT)
Age: 70
End: 2025-06-12

## 2025-06-12 DIAGNOSIS — R74.8 ELEVATED ALKALINE PHOSPHATASE LEVEL: Primary | ICD-10-CM

## 2025-06-12 DIAGNOSIS — K80.20 GALLSTONES: ICD-10-CM

## 2025-06-23 ENCOUNTER — HOSPITAL ENCOUNTER (OUTPATIENT)
Facility: HOSPITAL | Age: 70
Discharge: HOME | End: 2025-06-26
Attending: INTERNAL MEDICINE
Payer: MEDICARE

## 2025-06-23 DIAGNOSIS — R74.8 ELEVATED ALKALINE PHOSPHATASE LEVEL: ICD-10-CM

## 2025-06-23 DIAGNOSIS — K80.20 GALLSTONES: ICD-10-CM

## 2025-06-23 PROCEDURE — 76705 ECHO EXAM OF ABDOMEN: CPT

## 2025-06-30 ENCOUNTER — RESULTS FOLLOW-UP (OUTPATIENT)
Age: 70
End: 2025-06-30

## 2025-07-01 ENCOUNTER — TELEPHONE (OUTPATIENT)
Age: 70
End: 2025-07-01

## 2025-07-01 NOTE — TELEPHONE ENCOUNTER
Caller:  Mi insurance plan [employer]  [phone]     Reported Issue:  Hx of diabetes, not taking statin    States statin recommended for heart pts.            Caller confirms readback of documented phone/fax number(s) as correct.      Appointments:  Last seen at [facility]:   6/3/2025   Future appointment [facility]:  12/4/2025

## 2025-07-21 ENCOUNTER — PATIENT MESSAGE (OUTPATIENT)
Age: 70
End: 2025-07-21

## 2025-07-21 DIAGNOSIS — R73.03 PREDIABETES: ICD-10-CM

## 2025-07-21 RX ORDER — METFORMIN HYDROCHLORIDE 500 MG/1
TABLET, EXTENDED RELEASE ORAL
Qty: 270 TABLET | Refills: 1 | Status: SHIPPED | OUTPATIENT
Start: 2025-07-21 | End: 2025-07-22 | Stop reason: SDUPTHER

## 2025-07-23 RX ORDER — METFORMIN HYDROCHLORIDE 500 MG/1
TABLET, EXTENDED RELEASE ORAL
Qty: 30 TABLET | Refills: 0 | Status: SHIPPED | OUTPATIENT
Start: 2025-07-23 | End: 2025-07-24 | Stop reason: SDUPTHER

## 2025-07-24 DIAGNOSIS — R73.03 PREDIABETES: ICD-10-CM

## 2025-07-24 RX ORDER — METFORMIN HYDROCHLORIDE 500 MG/1
TABLET, EXTENDED RELEASE ORAL
Qty: 270 TABLET | Refills: 2 | Status: SHIPPED | OUTPATIENT
Start: 2025-07-24

## 2025-07-24 NOTE — TELEPHONE ENCOUNTER
PCP: Lashanda Louis MD    Last appt:   6/3/2025    Future Appointments   Date Time Provider Department Center   12/4/2025 10:00 AM Lashanda Louis MD [facility] DEP       Requested Prescriptions     Pending Prescriptions Disp Refills    metFORMIN (GLUCOPHAGE-XR) 500 MG extended release tablet 270 tablet 2     Sig: TAKE 2 TABLETS IN THE      MORNING AND 1 TABLET IN THEEVENING

## 2025-08-12 ENCOUNTER — TELEPHONE (OUTPATIENT)
Age: 70
End: 2025-08-12